# Patient Record
Sex: MALE | Race: WHITE | Employment: OTHER | URBAN - METROPOLITAN AREA
[De-identification: names, ages, dates, MRNs, and addresses within clinical notes are randomized per-mention and may not be internally consistent; named-entity substitution may affect disease eponyms.]

---

## 2018-03-16 ENCOUNTER — THERAPY VISIT (OUTPATIENT)
Dept: PHYSICAL THERAPY | Facility: CLINIC | Age: 63
End: 2018-03-16
Payer: OTHER MISCELLANEOUS

## 2018-03-16 DIAGNOSIS — M54.50 ACUTE LEFT-SIDED LOW BACK PAIN WITHOUT SCIATICA: Primary | ICD-10-CM

## 2018-03-16 PROCEDURE — 97161 PT EVAL LOW COMPLEX 20 MIN: CPT | Mod: GP | Performed by: PHYSICAL THERAPIST

## 2018-03-16 PROCEDURE — 97140 MANUAL THERAPY 1/> REGIONS: CPT | Mod: GP | Performed by: PHYSICAL THERAPIST

## 2018-03-16 PROCEDURE — 97110 THERAPEUTIC EXERCISES: CPT | Mod: GP | Performed by: PHYSICAL THERAPIST

## 2018-03-16 NOTE — PROGRESS NOTES
[facility] Initial Evaluation  Subjective:  Patient is a 63 year old male presenting with rehab back hpi. The history is provided by the patient. No  was used.   Kobi Blanco is a 63 year old male with a lumbar condition.  Condition occurred with:  A fall/slip.  Condition occurred: at work.  This is a recurrent condition  Was doing some inspection of furniture at work.  As he was pulling a box out to inspect it from the bottom of the box, the dresser started to fall and he tried to slow the fall on 2/28/18.  The pain slowly increased over the next couple of days.  Pain continued to get worse when he had to inspect some metal pieces of furniture that weighed ~30#.  Then went MD and was put on muscle relaxers with no relief.  Was off of work for 2 weeks.  Returned to work this last week.  When he went back to the MD he was started on a prednisone pack.   Pain increases with prolonged standing (20-30 min), sitting >1 hr, lifting >40#.  Feels better with massage, light stretching..    Patient reports pain:  Lower lumbar spine.  Radiates to:  Thigh left and gluteals left.  Pain is described as aching and is constant (intermittent spikes) and reported as 7/10.  Associated symptoms:  Loss of motion/stiffness. Pain is the same all the time.  Symptoms are exacerbated by sitting, lifting, walking, standing and twisting and relieved by rest and NSAID's (massage).  Since onset symptoms are gradually improving.  Special tests:  MRI (mild scoliosis).  Previous treatment includes physical therapy.  There was significant improvement following previous treatment.  General health as reported by patient is good.  Pertinent medical history includes:  None.  Medical allergies: no.  Other surgeries include:  No.  Current medications:  None as reported by the patient.  Current occupation is  for furniture    Pt goal: improve core strength to improve his lifting at  work.  Patient is working in normal job with restrictions.  Primary job tasks include:  Repetitive tasks, lifting, prolonged standing and prolonged sitting.    Barriers include:  None as reported by the patient.    Red flags:  None as reported by the patient.                        Objective:  System    Physical Exam                                         Musculoskeletal:        Back:        KYLE Blanco , : 1955, MRN: [id number]    Physical Therapy Objective Findings  Subjective information, goals, clinical impression, daily documentation and other information found in EPISODES tab.  Objective:     Lumbar Pain    Posture: mild scoliosis apex of curve R T12/L1  Gait:  normal  Lumbar Range of Motion:  Flexion                                              66%                                                                                                                             Extension 50%   Right Side Bending 50%   Left Side Bending 66%   Repeated extension- standing    Repeated flexion- standing      Pelvic screen:                                                                         Positive                                            Negative                                             Standing Forward Bend  x   Gillet(March)  x   Supine to sit     Sacroiliac provocation test     Pubic symphysis provocation            -resisted hip add at 45     Other:       Hip Screen:                                                                  Positive                                             Negative                                             Hip ROM     Lashanda GALAN     Other:     Manual Muscle Testing (graded 0-5, measured at 0 degrees unless otherwise noted):                                                                              Right                                  Left                                                     Transversus Abdominus     -Rd Leg Lowering  (deg) 60%    Hip Flex L2 4 4   Hip Abd     Hip Add     Hip Ext 3 3   Knee Flex 5 5   Knee Ext L3 5 5   Ankle Dorsiflexion L4 5 5   Great Toe Extension L5 5 5   Ankle Plantar Flexion S1 5 5   Other:     (+ mild pain, ++ moderate pain, +++ severe pain)    Special Tests:                                                                     Positive                                             Negative                                             Sign of Buttock  x   SLR  X (sciatic n tension at 75 deg B)   Fabrizio Test     Ely Test     Prone instability Test     Crossover SLR     Repeated extension prone     Other:       Flexibility:                                                              Right                                                 Left                                                      Hamstring SLR 75 SLR 75   Hip flexor normal Normal   Quadricep Prone knee flex 125 Prone knee flex 120   Ramesh's     piriformis normal normal   Other:            Segmental Mobility: hypomobile L5, T8-L3, mild pain PA L4    Palpation: hypertonicity L QL     -If symptoms past hip, must do neuro testing  Dermatome/Sensory  Testing:  Normal to light touch BLE  Reflex Testing:                                                                 Right                                                  Left                                                     Patellar Tendon normal normal   Achilles Tendon normal normal   Babinski         Assessment/Plan:    Patient is a 63 year old male with lumbar complaints.    Patient has the following significant findings with corresponding treatment plan.                Diagnosis 1:  Low back pain consistent with L4-L5 disc irritation with hypertonicity L QL    Pain -  hot/cold therapy, manual therapy, self management, education and home program  Decreased ROM/flexibility - manual therapy, therapeutic exercise and home program  Decreased joint mobility - manual therapy, therapeutic exercise and home  program  Decreased strength - therapeutic exercise, therapeutic activities and home program  Decreased function - therapeutic activities and home program  Impaired posture - neuro re-education and home program    Therapy Evaluation Codes:   1) History comprised of:   Personal factors that impact the plan of care:      Past/current experiences and Time since onset of symptoms.    Comorbidity factors that impact the plan of care are:      None.     Medications impacting care: None.  2) Examination of Body Systems comprised of:   Body structures and functions that impact the plan of care:      Lumbar spine.   Activity limitations that impact the plan of care are:      Bending, Lifting, Sitting, Standing and Working.  3) Clinical presentation characteristics are:   Stable/Uncomplicated.  4) Decision-Making    Low complexity using standardized patient assessment instrument and/or measureable assessment of functional outcome.  Cumulative Therapy Evaluation is: Low complexity.    Previous and current functional limitations:  (See Goal Flow Sheet for this information)    Short term and Long term goals: (See Goal Flow Sheet for this information)     Communication ability:  Patient appears to be able to clearly communicate and understand verbal and written communication and follow directions correctly.  Treatment Explanation - The following has been discussed with the patient:   RX ordered/plan of care  Anticipated outcomes  Possible risks and side effects  This patient would benefit from PT intervention to resume normal activities.   Rehab potential is good.    Frequency:  1 X a month, once daily  Duration:  for 6 weeks  Discharge Plan:  Achieve all LTG.  Independent in home treatment program.  Reach maximal therapeutic benefit.    Please refer to the daily flowsheet for treatment today, total treatment time and time spent performing 1:1 timed codes.     Gerald Ramos,PT, DPT, OCS

## 2018-03-16 NOTE — MR AVS SNAPSHOT
After Visit Summary   3/16/2018    Kobi Blanco    MRN: [id number]           Patient Information     Date Of Birth          1955        Visit Information        Provider Department      3/16/2018 3:30 PM Gerald Ramos, PT [facility] Physical Therapy        Today's Diagnoses     Acute left-sided low back pain without sciatica    -  1       Follow-ups after your visit        Your next 10 appointments already scheduled     Mar 27, 2018  3:10 PM CDT   [facility] Sp[facility] with Gerald Ramos PT   [facility] Physical Therapy ([facility]  )    [address]  [place] [address]   [phone]            Apr 03, 2018  3:10 PM CDT   PAM Spine with Gerald Ramos PT   [facility] Physical Therapy ([facility]  )    [address]  [place] [address]   [phone]            Apr 10, 2018  3:10 PM CDT   PAM Spine with Gerald Ramos, PT   [facility] Physical Therapy ([facility]  )    [address]  [facility] [address]   [phone]              Who to contact     If you have questions or need follow up information about today's clinic visit or your schedule please contact [facility] PHYSICAL THERAPY directly at [phone].  Normal or non-critical lab and imaging results will be communicated to you by MyChart, letter or phone within 4 business days after the clinic has received the results. If you do not hear from us within 7 days, please contact the clinic through [employer] or phone. If you have a critical or abnormal lab result, we will notify you by phone as soon as possible.  Submit refill requests through [employer] or call your pharmacy and they will forward the refill request to us. Please allow 3 business days for your refill to be completed.          Additional Information About  "Your Visit        MyChart Information     [employer] lets you send messages to your doctor, view your test results, renew your prescriptions, schedule appointments and more. To sign up, go to [email or web address] . Click on \"Log in\" on the left side of the screen, which will take you to the Welcome page. Then click on \"Sign up Now\" on the right side of the page.     You will be asked to enter the access code listed below, as well as some personal information. Please follow the directions to create your username and password.     Your access code is: [email or web address]  Expires: 2018  4:37 PM     Your access code will  in 90 days. If you need help or a new code, please call your [place] clinic or [phone].        Care EveryWhere ID     This is your Care EveryWhere ID. This could be used by other organizations to access your [place] medical records  [id number]         Blood Pressure from Last 3 Encounters:   10/28/10 136/78    Weight from Last 3 Encounters:   10/28/10 87.8 kg (193 lb 8 oz)              We Performed the Following     HC PT EVAL, LOW COMPLEXITY     PAM INITIAL EVAL REPORT     MANUAL THER TECH,1+REGIONS,EA 15 MIN     THERAPEUTIC EXERCISES        Primary Care Provider Office Phone # Fax #    [facility] [place] [phone]       [facility] [address] [place] [address]        Equal Access to Services     KRISTEL ROWE AH: Hadii aad ku hadasho Soomaali, waaxda luqadaha, qaybta kaalmada adeegyada, oswald hernandezin carmellan jake damian ah. So [facility] [phone].    ATENCIÓN: Si habla español, tiene a angulo disposición servicios gratuitos de asistencia lingüística. Llame al [phone].    We comply with applicable federal civil rights laws and [place] laws. We do not discriminate on the basis of race, color, national origin, age, disability, sex, sexual orientation, or gender identity.            Thank you!     Thank you for choosing [place] [facility] " [facility] PHYSICAL THERAPY  for your care. Our goal is always to provide you with excellent care. Hearing back from our patients is one way we can continue to improve our services. Please take a few minutes to complete the written survey that you may receive in the mail after your visit with us. Thank you!             Your Updated Medication List - Protect others around you: Learn how to safely use, store and throw away your medicines at www.disposemymeds.org.      Notice  As of 3/16/2018  4:37 PM    You have not been prescribed any medications.

## 2018-03-16 NOTE — LETTER
[facility] PHYSICAL [facility]  [address]  [facility] [id number] [phone]    2018    Re: Kobi Blanco   :   1955  MRN:  [id number]   REFERRING PHYSICIAN:   Gerald Blakely    [facility]    Date of Initial Evaluation:  3/16/18  Visits:  Rxs Used: 1  Reason for Referral:  Acute left-sided low back pain without sciatica    EVALUATION SUMMARY    [facility] Initial Evaluation  Subjective:  Patient is a 63 year old male presenting with rehab back hpi. The history is provided by the patient. No  was used.   Kobi Blanco is a 63 year old male with a lumbar condition.  Condition occurred with:  A fall/slip.  Condition occurred: at work.  This is a recurrent condition  Was doing some inspection of furniture at work.  As he was pulling a box out to inspect it from the bottom of the box, the dresser started to fall and he tried to slow the fall on 18.  The pain slowly increased over the next couple of days.  Pain continued to get worse when he had to inspect some metal pieces of furniture that weighed ~30#.  Then went MD and was put on muscle relaxers with no relief.  Was off of work for 2 weeks.  Returned to work this last week.  When he went back to the MD he was started on a prednisone pack.   Pain increases with prolonged standing (20-30 min), sitting >1 hr, lifting >40#.  Feels better with massage, light stretching..    Patient reports pain:  Lower lumbar spine.  Radiates to:  Thigh left and gluteals left.  Pain is described as aching and is constant (intermittent spikes) and reported as 7/10.  Associated symptoms:  Loss of motion/stiffness. Pain is the same all the time.  Symptoms are exacerbated by sitting, lifting, walking, standing and twisting and relieved by rest and NSAID's (massage).  Since onset symptoms are gradually improving.  Special  tests:  MRI (mild scoliosis).  Previous treatment includes physical therapy.  There was significant improvement following previous treatment.  General health as reported by patient is good.  Pertinent medical history includes:  None.  Medical allergies: no.  Other surgeries include:  No.  Current medications:  None as reported by the patient.  Current occupation is  for furniture    Pt goal: improve core strength to improve his lifting at work.  Patient is working in normal job with restrictions.  Primary job tasks include:  Repetitive tasks, lifting, prolonged standing and prolonged sitting.    Barriers include:  None as reported by the patient.    Red flags:  None as reported by the patient.                      Objective:                                   Musculoskeletal:        Back:      Kobi Blanco , : 1955, MRN: [id number]    Physical Therapy Objective Findings  Subjective information, goals, clinical impression, daily documentation and other information found in EPISODES tab.  Objective:     Lumbar Pain    Posture: mild scoliosis apex of curve R T12/L1  Gait:  normal  Lumbar Range of Motion:  Flexion                                              66%                                                                                                                             Extension 50%   Right Side Bending 50%   Left Side Bending 66%   Repeated extension- standing    Repeated flexion- standing      Pelvic screen:                                                                         Positive                                            Negative                                             Standing Forward Bend  x   Gillet(March)  x   Supine to sit     Sacroiliac provocation test     Pubic symphysis provocation            -resisted hip add at 45     Other:       Hip Screen:                                                                  Positive                                              Negative                                             Hip ROM     Lashanda GALAN     Other:     Manual Muscle Testing (graded 0-5, measured at 0 degrees unless otherwise noted):                                                                              Right                                  Left                                                     Transversus Abdominus     -Rd Leg Lowering (deg) 60%    Hip Flex L2 4 4   Hip Abd     Hip Add     Hip Ext 3 3   Knee Flex 5 5   Knee Ext L3 5 5   Ankle Dorsiflexion L4 5 5   Great Toe Extension L5 5 5   Ankle Plantar Flexion S1 5 5   Other:     (+ mild pain, ++ moderate pain, +++ severe pain)    Special Tests:                                                                     Positive                                             Negative                                             Sign of Buttock  x   SLR  X (sciatic n tension at 75 deg B)   Fabrizio Test     Ely Test     Prone instability Test     Crossover SLR     Repeated extension prone     Other:       Flexibility:                                                              Right                                                 Left                                                      Hamstring SLR 75 SLR 75   Hip flexor normal Normal   Quadricep Prone knee flex 125 Prone knee flex 120   Ramesh's     piriformis normal normal   Other:            Segmental Mobility: hypomobile L5, T8-L3, mild pain PA L4    Palpation: hypertonicity L QL     -If symptoms past hip, must do neuro testing  Dermatome/Sensory  Testing:  Normal to light touch BLE  Reflex Testing:                                                                 Right                                                  Left                                                     Patellar Tendon normal normal   Achilles Tendon normal normal   Babinski         Assessment/Plan:    Patient is a 63 year old male with lumbar complaints.    Patient  has the following significant findings with corresponding treatment plan.                Diagnosis 1:  Low back pain consistent with L4-L5 disc irritation with hypertonicity L QL  Pain -  hot/cold therapy, manual therapy, self management, education and home program  Decreased ROM/flexibility - manual therapy, therapeutic exercise and home program  Decreased joint mobility - manual therapy, therapeutic exercise and home program  Decreased strength - therapeutic exercise, therapeutic activities and home program  Decreased function - therapeutic activities and home program  Impaired posture - neuro re-education and home program    Therapy Evaluation Codes:   1) History comprised of:   Personal factors that impact the plan of care:      Past/current experiences and Time since onset of symptoms.    Comorbidity factors that impact the plan of care are:      None.     Medications impacting care: None.  2) Examination of Body Systems comprised of:   Body structures and functions that impact the plan of care:      Lumbar spine.   Activity limitations that impact the plan of care are:      Bending, Lifting, Sitting, Standing and Working.  3) Clinical presentation characteristics are:   Stable/Uncomplicated.  4) Decision-Making    Low complexity using standardized patient assessment instrument and/or measureable assessment of functional outcome.  Cumulative Therapy Evaluation is: Low complexity.    Previous and current functional limitations:  (See Goal Flow Sheet for this information)    Short term and Long term goals: (See Goal Flow Sheet for this information)     Communication ability:  Patient appears to be able to clearly communicate and understand verbal and written communication and follow directions correctly.  Treatment Explanation - The following has been discussed with the patient:   RX ordered/plan of care  Anticipated outcomes  Possible risks and side effects  This patient would benefit from PT intervention to  resume normal activities.   Rehab potential is good.      Frequency:  1 X a month, once daily  Duration:  for 6 weeks  Discharge Plan:  Achieve all LTG.  Independent in home treatment program.  Reach maximal therapeutic benefit.         Thank you for your referral.    INQUIRIES  Therapist: Gerald Ramos,PT, DPT, [facility]    [facility] PHYSICAL [facility]ERAP[facility]  [address] [place] [address] [address]  [facility] [address]  Phone: [phone]  Fax: [phone]

## 2018-03-27 ENCOUNTER — THERAPY VISIT (OUTPATIENT)
Dept: PHYSICAL THERAPY | Facility: CLINIC | Age: 63
End: 2018-03-27
Payer: OTHER MISCELLANEOUS

## 2018-03-27 DIAGNOSIS — M54.50 ACUTE LEFT-SIDED LOW BACK PAIN WITHOUT SCIATICA: ICD-10-CM

## 2018-03-27 PROCEDURE — 97110 THERAPEUTIC EXERCISES: CPT | Mod: GP | Performed by: PHYSICAL THERAPIST

## 2018-03-27 PROCEDURE — 97140 MANUAL THERAPY 1/> REGIONS: CPT | Mod: GP | Performed by: PHYSICAL THERAPIST

## 2018-03-27 PROCEDURE — 97112 NEUROMUSCULAR REEDUCATION: CPT | Mod: GP | Performed by: PHYSICAL THERAPIST

## 2018-04-03 ENCOUNTER — THERAPY VISIT (OUTPATIENT)
Dept: PHYSICAL THERAPY | Facility: CLINIC | Age: 63
End: 2018-04-03
Payer: OTHER MISCELLANEOUS

## 2018-04-03 DIAGNOSIS — M54.50 ACUTE LEFT-SIDED LOW BACK PAIN WITHOUT SCIATICA: ICD-10-CM

## 2018-04-03 PROCEDURE — 97110 THERAPEUTIC EXERCISES: CPT | Mod: GP | Performed by: PHYSICAL THERAPIST

## 2018-04-03 PROCEDURE — 97112 NEUROMUSCULAR REEDUCATION: CPT | Mod: GP | Performed by: PHYSICAL THERAPIST

## 2018-04-24 ENCOUNTER — THERAPY VISIT (OUTPATIENT)
Dept: PHYSICAL THERAPY | Facility: CLINIC | Age: 63
End: 2018-04-24
Payer: OTHER MISCELLANEOUS

## 2018-04-24 DIAGNOSIS — M54.50 ACUTE LEFT-SIDED LOW BACK PAIN WITHOUT SCIATICA: ICD-10-CM

## 2018-04-24 PROCEDURE — 97110 THERAPEUTIC EXERCISES: CPT | Mod: GP | Performed by: PHYSICAL THERAPIST

## 2018-04-24 PROCEDURE — 97530 THERAPEUTIC ACTIVITIES: CPT | Mod: GP | Performed by: PHYSICAL THERAPIST

## 2018-05-01 ENCOUNTER — THERAPY VISIT (OUTPATIENT)
Dept: PHYSICAL THERAPY | Facility: CLINIC | Age: 63
End: 2018-05-01
Payer: OTHER MISCELLANEOUS

## 2018-05-01 DIAGNOSIS — M54.50 ACUTE LEFT-SIDED LOW BACK PAIN WITHOUT SCIATICA: ICD-10-CM

## 2018-05-01 PROCEDURE — 97530 THERAPEUTIC ACTIVITIES: CPT | Mod: GP | Performed by: PHYSICAL THERAPIST

## 2018-05-01 PROCEDURE — 97110 THERAPEUTIC EXERCISES: CPT | Mod: GP | Performed by: PHYSICAL THERAPIST

## 2018-05-01 NOTE — MR AVS SNAPSHOT
"              After Visit Summary   2018    Kobi Blanco    MRN: [id number]           Patient Information     Date Of Birth          1955        Visit Information        Provider Department      2018 3:50 PM Gerald Ramos PT [facility]        Today's Diagnoses     Acute left-sided low back pain without sciatica           Follow-ups after your visit        Who to contact     If you have questions or need follow up information about today's clinic visit or your schedule please contact [facility] directly at [phone].  Normal or non-critical lab and imaging results will be communicated to you by MakeSpacehart, letter or phone within 4 business days after the clinic has received the results. If you do not hear from us within 7 days, please contact the clinic through MakeSpacehart or phone. If you have a critical or abnormal lab result, we will notify you by phone as soon as possible.  Submit refill requests through Hintsoft or call your pharmacy and they will forward the refill request to us. Please allow 3 business days for your refill to be completed.          Additional Information About Your Visit        MyChart Information     Hintsoft lets you send messages to your doctor, view your test results, renew your prescriptions, schedule appointments and more. To sign up, go to [email or web address] . Click on \"Log in\" on the left side of the screen, which will take you to the Welcome page. Then click on \"Sign up Now\" on the right side of the page.     You will be asked to enter the access code listed below, as well as some personal information. Please follow the directions to create your username and password.     Your access code is: [email or web address]  Expires: 2018  4:37 PM     Your access code will  in 90 days. If you need help or a new code, please call your [place] clinic or [phone].      "   Care EveryWhere ID     This is your Care EveryWhere ID. This could be used by other organizations to access your [place] medical records  [id number]         Blood Pressure from Last 3 Encounters:   10/28/10 136/78    Weight from Last 3 Encounters:   10/28/10 87.8 kg (193 lb 8 oz)              We Performed the Following     PAM PROGRESS NOTES REPORT     THERAPEUTIC ACTIVITIES     THERAPEUTIC EXERCISES        Primary Care Provider Office Phone # Fax #    [facility] [phone]       [facility] [address]  [facility] [address]        Equal Access to Services     [facility]: Hadii aad ku hadasho Soomaali, waaxda luqadaha, qaybta kaalmada adeegyada, oswald damian . So [facility] [phone].    ATENCIÓN: Si habla español, tiene a angulo disposición servicios gratuitos de asistencia lingüística. [facility] [phone].    We comply with applicable federal civil rights laws and [place] laws. We do not discriminate on the basis of race, color, national origin, age, disability, sex, sexual orientation, or gender identity.            Thank you!     Thank you for choosing [facility]  for your care. Our goal is always to provide you with excellent care. Hearing back from our patients is one way we can continue to improve our services. Please take a few minutes to complete the written survey that you may receive in the mail after your visit with us. Thank you!             Your Updated Medication List - Protect others around you: Learn how to safely use, store and throw away your medicines at www.disposemymeds.org.      Notice  As of 5/1/2018  4:45 PM    You have not been prescribed any medications.

## 2018-05-01 NOTE — LETTER
[facility]  [address]  [facility] [id number] [phone]    May 2, 2018    Re: Kobi Blanco   :   1955  MRN:  [id number]   REFERRING PHYSICIAN:   Greald Blakely    [facility]  Date of Initial Evaluation: 3/16/18  Visits:  Rxs Used: 5  Reason for Referral:  Acute left-sided low back pain without sciatica  Oswestry Score: 0 %                 DISCHARGE REPORT  Progress reporting period is from 3/16/18 to 18.       SUBJECTIVE  Subjective changes noted by patient:  doing pretty well doing day, will get some muscle spasms if he does a lot of standing, can control some of the muscle spasms with stretching, no problems sleeping, lifting going well around house, no problems lifting salt bags, no problems moving furniture at work as long as he is careful with how he lifts    Current Pain level: 0/10 (worst 1-2/10).     Previous pain level was  NA Initial Pain level: 8/10.   Changes in function:  Yes (See Goal flowsheet attached for changes in current functional level)  Adverse reaction to treatment or activity: None    OBJECTIVE  Changes noted in objective findings:    Objective: lumbar ROM: flex 90%, ext 50%, R SB 75%, L SB 75%, SLR R 90, L 90, double leg lowering 75%, MMT: hip ext: 4/5 B, middle trap 5/5 B, lower trap 4/5 B     ASSESSMENT/PLAN  Updated problem list and treatment plan: Diagnosis 1:  Low back pain consistent with L4-L5 disc irritation with hypertonicity L QL  Pain -  home program  Decreased strength - home program  STG/LTGs have been met or progress has been made towards goals:  Yes (See Goal flow sheet completed today.)  Assessment of Progress: The patient has met all of their long term goals.  Self Management Plans:  Patient has been instructed in a home treatment program.  I have re-evaluated this patient and find that the nature, scope, duration and intensity of the  therapy is appropriate for the medical condition of the patient.  Kobi continues to require the following intervention to meet STG and LTG's:  PT intervention is no longer required to meet STG/LTG.    Recommendations:  This patient is ready to be discharged from therapy and continue their home treatment program.          Thank you for your referral.    INQUIRIES  Therapist: Gerald Ramos,PT, DPT, [facility]    [facility]  [address] [place] [address] [address]  [facility] [address]  Phone: [phone]  Fax: [phone]

## 2018-05-01 NOTE — PROGRESS NOTES
Subjective:  HPI  Oswestry Score: 0 %                 Objective:  System    Physical Exam    General     ROS    Assessment/Plan:    DISCHARGE REPORT    Progress reporting period is from 3/16/18 to 5/1/18.       SUBJECTIVE  Subjective changes noted by patient:  doing pretty well doing day, will get some muscle spasms if he does a lot of standing, can control some of the muscle spasms with stretching, no problems sleeping, lifting going well around house, no problems lifting salt bags, no problems moving furniture at work as long as he is careful with how he lifts    Current Pain level: 0/10 (worst 1-2/10).     Previous pain level was  NA Initial Pain level: 8/10.   Changes in function:  Yes (See Goal flowsheet attached for changes in current functional level)  Adverse reaction to treatment or activity: None    OBJECTIVE  Changes noted in objective findings:    Objective: lumbar ROM: flex 90%, ext 50%, R SB 75%, L SB 75%, SLR R 90, L 90, double leg lowering 75%, MMT: hip ext: 4/5 B, middle trap 5/5 B, lower trap 4/5 B     ASSESSMENT/PLAN  Updated problem list and treatment plan: Diagnosis 1:  Low back pain consistent with L4-L5 disc irritation with hypertonicity L QL    Pain -  home program  Decreased strength - home program  STG/LTGs have been met or progress has been made towards goals:  Yes (See Goal flow sheet completed today.)  Assessment of Progress: The patient has met all of their long term goals.  Self Management Plans:  Patient has been instructed in a home treatment program.  I have re-evaluated this patient and find that the nature, scope, duration and intensity of the therapy is appropriate for the medical condition of the patient.  Kobi continues to require the following intervention to meet STG and LTG's:  PT intervention is no longer required to meet STG/LTG.    Recommendations:  This patient is ready to be discharged from therapy and continue their home treatment program.    Please refer to the  daily flowsheet for treatment today, total treatment time and time spent performing 1:1 timed codes.      Gerald Ramos,PT, DPT, OCS

## 2019-07-12 ENCOUNTER — TELEPHONE (OUTPATIENT)
Dept: NEUROLOGY | Facility: CLINIC | Age: 64
End: 2019-07-12

## 2019-07-12 NOTE — TELEPHONE ENCOUNTER
[facility] Call Center    Phone Message    May a detailed message be left on voicemail: yes    Reason for Call: Other: pt's wife calling to get more information on treatments plans or something to help the pt feel like he is helping better his situation. Please call pt's wife back to discuss.     Action Taken: Message routed to:  [facility] neurology

## 2019-07-15 NOTE — TELEPHONE ENCOUNTER
Patient will be seeing Dr. Massey for the first time on 7/31/19. Called patient's wife Juan, back and left voice mail that she should ask for the authorization to discuss protected health information form when they come on the 31st.

## 2019-07-22 NOTE — TELEPHONE ENCOUNTER
RECORDS RECEIVED FROM: External - Dr. Jason Owusu, records at [facility]    DATE RECEIVED: 7/31/19   NOTES (FOR ALL VISITS) STATUS DETAILS   OFFICE NOTE from referring provider Care Everywhere 7/10/19  7/9/19  5/30/19   OFFICE NOTE from other specialist N/A    DISCHARGE SUMMARY from hospital N/A    DISCHARGE REPORT from the ER N/A    OPERATIVE REPORT N/A    MEDICATION LIST Care Everywhere    IMAGING  (FOR ALL VISITS)     EMG Care Everywhere [facility]:  5/30/19   EEG N/A    ECT N/A    MRI (HEAD, NECK, SPINE) Received  [facility]:  MRI Lumbar Spine 5/3/19  MRI Thoracic Spine 5/3/19  MRI Brain 4/27/19  MRI Cervical Spine 4/27/19   LUMBAR PUNCTURE N/A    SAVANA Scan N/A    CT (HEAD, NECK, SPINE) N/A       Action    Action Taken Imaging request faxed to [facility]

## 2019-07-26 NOTE — TELEPHONE ENCOUNTER
Imaging Received  [facility]   Image Type (x): Disc:   PACS: x   Exam Date/Name MRI Lumbar Spine 5/3/19  MRI Thoracic Spine 5/3/19  MRI Brain 4/27/19  MRI Cervical Spine 4/27/19 Comments: Images resolved in PACS

## 2019-07-30 ENCOUNTER — DOCUMENTATION ONLY (OUTPATIENT)
Dept: CARE COORDINATION | Facility: CLINIC | Age: 64
End: 2019-07-30

## 2019-07-31 ENCOUNTER — PRE VISIT (OUTPATIENT)
Dept: NEUROLOGY | Facility: CLINIC | Age: 64
End: 2019-07-31

## 2019-07-31 ENCOUNTER — OFFICE VISIT (OUTPATIENT)
Dept: NEUROLOGY | Facility: CLINIC | Age: 64
End: 2019-07-31
Payer: COMMERCIAL

## 2019-07-31 VITALS
HEART RATE: 105 BPM | DIASTOLIC BLOOD PRESSURE: 96 MMHG | BODY MASS INDEX: 25.99 KG/M2 | WEIGHT: 197 LBS | SYSTOLIC BLOOD PRESSURE: 157 MMHG

## 2019-07-31 DIAGNOSIS — G90.3 MULTIPLE SYSTEM ATROPHY (H): Primary | ICD-10-CM

## 2019-07-31 DIAGNOSIS — G23.8 MULTIPLE SYSTEM ATROPHY (H): Primary | ICD-10-CM

## 2019-07-31 RX ORDER — UBIDECARENONE 100 MG
1000 CAPSULE ORAL
COMMUNITY

## 2019-07-31 RX ORDER — AMLODIPINE BESYLATE 5 MG/1
5 TABLET ORAL DAILY
COMMUNITY

## 2019-07-31 RX ORDER — MULTIVITAMIN
1 CAPSULE ORAL
COMMUNITY

## 2019-07-31 RX ORDER — CHLORAL HYDRATE 500 MG
4 CAPSULE ORAL
COMMUNITY

## 2019-07-31 RX ORDER — PSYLLIUM HUSK/CALCIUM CARB 1 G-60 MG
CAPSULE ORAL
COMMUNITY

## 2019-07-31 ASSESSMENT — UNIFIED PARKINSONS DISEASE RATING SCALE (UPDRS)
TOTAL_SCORE: 13
POSTURE: 0 NORMAL, NO PROBLEMS
POSTURAL_STABILITY: MODERATE: STANDS SAFELY, BUT WITH ABSENCE OF POSTURAL RESPONSE,  FALLS IF NOT CAUGHT BY EXAMINER.
FACIAL_EXPRESSION: MILD: IN ADDITION TO DECREASED EYE-BLINK FREQUENCY, MASKED FACIES PRESENT IN THE LOWER FACE AS WELL, NAMELY FEWER MOVEMENTS AROUND THE MOUTH, SUCH AS LESS SPONTANEOUS SMILING, BUT LIPS NOT PARTED.
TOETAPPING_RIGHT: NORMAL
GAIT: SLIGHT: INDEPENDENT WALKING WITH MINOR GAIT IMPAIRMENT.
HANDMOVEMENTS_RIGHT: SLIGHT: ANY OF THE FOLLOWING: A) THE REGULAR RHYTHM IS BROKEN WITH ONE WITH ONE OR TWO INTERRUPTIONS OR HESITATIONS OF THE MOVEMENT B) SLIGHT SLOWING C) THE AMPLITUDE DECREMENTS NEAR THE END OF THE 10 MOVEMENTS.
SPONTANEITY_OF_MOVEMENT: 0: NORMAL.  NO PROBLEMS.
RIGIDITY_LUE: NORMAL
AMPLITUDE_LIP_JAW: NORMAL: NO TREMOR.
AXIAL_SCORE: 8
SPEECH: MILD: LOSS OF MODULATION, DICTION OR VOLUME, WITH A FEW WORDS UNCLEAR, BUT THE OVERALL SENTENCES EASY TO FOLLOW.
PRONATION_SUPINATION_LEFT: NORMAL
RIGIDITY_NECK: NORMAL
RIGIDITY_LLE: NORMAL
AMPLITUDE_RLE: NORMAL: NO TREMOR.
HANDMOVEMENTS_LEFT: SLIGHT: ANY OF THE FOLLOWING: A) THE REGULAR RHYTHM IS BROKEN WITH ONE WITH ONE OR TWO INTERRUPTIONS OR HESITATIONS OF THE MOVEMENT B) SLIGHT SLOWING C) THE AMPLITUDE DECREMENTS NEAR THE END OF THE 10 MOVEMENTS.
CONSTANCY_TREMOR_ATREST: NORMAL: NO TREMOR.
FINGER_TAPPING_RIGHT: SLIGHT: ANY OF THE FOLLOWING: A) THE REGULAR RHYTHM IS BROKEN WITH ONE WITH ONE OR TWO INTERRUPTIONS OR HESITATIONS OF THE MOVEMENT B) SLIGHT SLOWING C) THE AMPLITUDE DECREMENTS NEAR THE END OF THE 10 MOVEMENTS.
AMPLITUDE_RUE: NORMAL: NO TREMOR.
LEG_AGILITY_LEFT: NORMAL
TOTAL_SCORE_LEFT: 2
TOTAL_SCORE: 3
FREEZING_GAIT: NORMAL
ARISING_CHAIR: NORMAL: ABLE TO ARISE QUICKLY WITHOUT HESITATION.
PRONATION_SUPINATION_RIGHT: NORMAL
AMPLITUDE_LUE: NORMAL: NO TREMOR.
RIGIDITY_RUE: SLIGHT: RIGIDITY ONLY DETECTED WITH ACTIVATION MANEUVER.
TOETAPPING_LEFT: NORMAL
PARKINSONS_MEDS: OFF
AMPLITUDE_LLE: NORMAL: NO TREMOR.
FINGER_TAPPING_LEFT: SLIGHT: ANY OF THE FOLLOWING: A) THE REGULAR RHYTHM IS BROKEN WITH ONE WITH ONE OR TWO INTERRUPTIONS OR HESITATIONS OF THE MOVEMENT B) SLIGHT SLOWING C) THE AMPLITUDE DECREMENTS NEAR THE END OF THE 10 MOVEMENTS.
LEG_AGILITY_RIGHT: NORMAL
RIGIDITY_RLE: NORMAL

## 2019-07-31 ASSESSMENT — PAIN SCALES - GENERAL: PAINLEVEL: NO PAIN (0)

## 2019-07-31 NOTE — LETTER
2019       RE: Kobi Blanco  [address] [place] [address]     Dear Colleague,    Thank you for referring your patient, Kobi Blanco, to the [facility] NEUROLOGY at [facility]. Please see a copy of my visit note below.    Department of Neurology  Movement Disorders Division     Patient: Kobi Blanco   MRN: [id number]   : 1955   Date of Visit: 2019     Reason for visit: Longitudinal care for multiple system atrophy    Referring Physician: Dr. GUSTABO Owusu    History of Present Illness    Mr. 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  This diagnosis was made recently at the [facility] in [place] and we are asked to provide longitudinal care. We are happy to provide this.  While at the [facility] the patient had seen Dr. Jimmy Owusu, Dr. June Singleton and Dr. Alonzo Melton.    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  He worked for the [facility] [employer] and for [employer].    15 years ago he began to have dream enactment.   He has been  to his current wife for 7 years.   She describes active dreaming for their entire marriage.  She has put up a pillow barrier so that she is not struck during his dreams.  The patient has jumped out of bed on two occasions without injury.  He is taking melatonin now at bedtime.  On this his wife says that his dream enactment is much less and consists of talking and occasional movement of his arms.    He has had autonomic dysfunction for 7 years.  This first involved erectile dysfunction.  It became much more severe about a year and a half ago.  He began to have urinary hesitance.  In 2019 he had catheterization with drainage of 1700 cc of urine by a  "urologist.  He now uses catheterization.  He has never had clear orthostatic dizziness.  He has never had heat exhaustion or heat stroke.    2 to 3 years ago he began to have some cognitive problems.  He had what he describes as \"brain fog\".  He felt himself unable to continue the intense work of flying helicopters.  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    2-1/2 years ago he began to have some imbalance.  It became worse and then he could not walk a straight line.  He went to a neurologist who found that he had poor tandem gait and finger-nose-finger problems.  He found it hard to get out of bed.  He has not had falls but he is slowing down overall.  He saw a neurologist who felt he had parkinsonism.    In April 2019 he saw Dr. Berry in neurological consultation at [facility].  MRI of the head and neck were performed which were normal.  An EMG was planned.    The patient could not wait for the work-up and went to the [facility] in [place] where his work-up was rapid and comprehensive.  He saw Dr. Jimmy Owusu.  There was some concern about stridor and the patient saw Dr. Alonzo Melton.  A polysomnogram was performed and stridor was not observed.  The patient did undergo autonomic reflex studies.  This showed an abnormal study. By report: \"There is evidence of focal or patchy postganglionic sympathetic pseudomotor, moderate cardio vagal and moderate cardiovascular adrenergic impairment.  The presence of orthostatic hypotension with modest adrenergic impairment may suggest contribution of other factors including medication effects.  The findings were milder than typically seen in multiple system atrophy but would be consistent with that diagnosis.\"  A thermoregulatory sweat test showed anhidrosis consistent with MSA.  EMG showed mild length dependent neuropathy.    The patient saw Dr. June Singleton and I read through her comprehensive and excellent summary.  She reviewed the MRI scan of the brain and felt " "there was cerebellar atrophy.  There was no \"hot cross bun\" sign.  Dr. Singleton felt all the data was consistent with multiple system atrophy.  She recommended follow-up in the MSA clinic at [place] yearly.    Parkinsonism Motor Symptom Review:    Motor fluctuations:     Dyskinesia:  Duration - na.  Disability - na.  Wearing off:  na.  Freezing of gait: no  Dystonia: no  Tremor: no  Rigidity: yes  Bradykinesia: yes     Parkinsonism Non-motor Symptom Review:    Psychiatric disturbances - no.  Cognitive impairment -  yes.  Sleep disturbances - yes.   GI symptoms - no.  Urinary symptoms - yes retention.   Balance - yes.  Pain - no.  Autonomic dysfunction - yes.  Hallucinations - yes.  Speech - soft.  Swallowing - water to wash.  Salivation - drool at night.  Dopamine agonist side effects - na.  Driving: good.  Living situation: private home  Medication compliance na.  ADL's: good.    Write disability letter  Balance heat exertion  100%    Review of Systems:  Other than that mentioned above, the remainder of 12 systems reviewed were negative.    Medications:  Current Outpatient Medications   Medication Sig Dispense Refill     Cholecalciferol (D3-1000) 1000 units CAPS Take 1,000 Units by mouth       multivitamin (DEKAS ESSENTIAL) capsule Take 1 capsule by mouth       Omega-3 1000 MG capsule Take 4 capsules by mouth           Physical Exam:  BP (!) 157/96   Pulse 105   Wt 89.4 kg (197 lb)   BMI 25.99 kg/m        Neurological exam  Mental status, patient is alert and oriented to person place time, provides details of the interval history.  Voice is soft and speech is slightly dysarthric.  Cranial nerves: Extraocular movements are intact, good smooth pursuit, may be decreased rate of eye blinking.  Face is symmetric, sensation intact to light touch.  Motor exam: No abnormal movements, rigidity in upper extremities 1+, no rigidity in the lower extremities.     The patient's  weight is 89.4 kg (197 lb).    UPDRS Values 7/31/2019 "   Time: 5:02 PM   Medication Off   R Brain DBS: None   L Brain DBS: None   Speech 2   Facial Expression 2   Rigidity Neck 0   Rigidity RUE 1   Rigidity LUE 0   Rigidity RLE 0   Rigidity LLE 0   Finger Taps R 1   Finger Taps L 1   Hand Mvt R 1   Hand Mvt L 1   Pron-/Supinate R 0   Pron-/Supinate L 0   Toe Tap R 0   Toe Tap L 0   Leg Agility R 0   Leg Agility L 0   Arise From Chair 0   Gait 1   Gait Freezing 0   Postural Stability 3   Posture 0   Global Spont Mvt 0   Postural Tremor RUE 0   Postural Tremor LUE 0   Kinetic Tremor RUE 0   Kinetic Tremor LUE 0   Rest Tremor RUE 0   Rest Tremor LUE 0   Rest Tremor RLE 0   Rest Tremor LLE 0   Rest Tremor Lip/Jaw 0   Rest Tremor Constancy 0   Total Right 3   Total Left 2   Axial Total 8   Total 13     Sensation seems to be intact to light touch.  Gait able to stand with arms crossed without support,  bilateral reduced arm swing, speed but decreased stride length.    Data Reviewed:   MRI brain reviewed, and noted some cerebellar atrophy, there are no structural lesions, no pontine atrophy, no hot bun cross sign.    Impression:   1.   Multiple system atrophy  2.  Neurogenic bladder secondary to #1    Plan:   -We encouraged to continue exercising safely, serial intensity exercises for 30 minutes due to anhidrosis.  Avoid increased duration of exercises.  -Patient would like to be evaluated by physical therapist at [facility], referral has been provided  -Patient is aware that carbidopa/levodopa therapy is available if his parkinsonian symptoms become worse.  -Patient agreed to follow-up with our clinic every 3 months for now and continue to be seen at [facility] probably once a year.  - The patient asked for a letter of full disability and we will provide this.    Patient seen and discussed with Dr. Dangelo.    Little Wolff MD  Movement Disorders fellow.    RTC: 3 months .    Summary of orders placed this encounter:  No orders of the defined types were placed  in this encounter.    I, Mariano Massey, personally interviewed, examined and evaluated this patient on 7/31/2019.  I discussed the patient with Dr. Little Wolff and agree with the assessment, examination and plan of care documented by Dr. Wolff.  I personally reviewed the vital signs, medications and labs/imaging.    Mariano Massey MD

## 2019-07-31 NOTE — LETTER
Kobi Blanco  [address] [facility] [address]      July 31, 2019    To whom it may concern:    I am the neurologist who saw Mr.Russell YUMIKO Blanco in the department of neurology at the [facility].  His evaluation occurred on July 31, 2019.    Based on my history and examination I have concluded that Mr. Blanco is totally disabled for his current work.  He carries a diagnosis of multiple system atrophy.  With this disease he cannot stand for long periods of time.  He cannot lift heavy objects.  He cannot be in a warm environment as he has poor temperature control.  All of these factors led me to conclude that Mr. Blanco's current occupation is hazardous for him and that he has total disability at this time.    Sincerely yours Mariano hilton MD

## 2019-07-31 NOTE — NURSING NOTE
Chief Complaint   Patient presents with     Consult     NEW PATIENT- JORGE Valdivia, FLEX  Patient Care Supervisor  Endocrinology & Diabetes   Neurology, Neurosurgery, PM&R

## 2019-07-31 NOTE — PROGRESS NOTES
"Department of Neurology  Movement Disorders Division     Patient: Kobi Blanco   MRN: [id number]   : 1955   Date of Visit: 2019     Reason for visit: Longitudinal care for multiple system atrophy    Referring Physician: Dr. GUSTABO Owusu    History of Present Illness    Mr. Blanco is a 64 year old male who presents to [facility] Movement Disorders clinic as a new patient for evaluation of multiple system atrophy.  This diagnosis was made recently at the [facility] in [place] and we are asked to provide longitudinal care. We are happy to provide this.  While at the [facility] the patient had seen Dr. Jimmy Owusu, Dr. June Singleton and Dr. Alonzo Melton.    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  He worked for the [facility] and for [employer].    15 years ago he began to have dream enactment.   He has been  to his current wife for 7 years.   She describes active dreaming for their entire marriage.  She has put up a pillow barrier so that she is not struck during his dreams.  The patient has jumped out of bed on two occasions without injury.  He is taking melatonin now at bedtime.  On this his wife says that his dream enactment is much less and consists of talking and occasional movement of his arms.    He has had autonomic dysfunction for 7 years.  This first involved erectile dysfunction.  It became much more severe about a year and a half ago.  He began to have urinary hesitance.  In 2019 he had catheterization with drainage of 1700 cc of urine by a urologist.  He now uses catheterization.  He has never had clear orthostatic dizziness.  He has never had heat exhaustion or heat stroke.    2 to 3 years ago he began to have some cognitive problems.  He had what he describes as \"brain fog\".  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    2-1/2 years ago he began to have some imbalance.  It became worse and then he could not walk a straight line.  He went to a neurologist who found that he had poor tandem gait and finger-nose-finger problems.  He found it hard to get out of bed.  He has not had falls but he is slowing down overall.  He saw a neurologist who felt he had parkinsonism.    In April 2019 he saw Dr. Berry in neurological consultation at [facility].  MRI of the head and neck were performed which were normal.  An EMG was planned.    The patient could not wait for the work-up and went to the [facility] in [place] where his work-up was rapid and comprehensive.  He saw Dr. Jimmy Owusu.  There was some concern about stridor and the patient saw Dr. Alonzo Melton.  A polysomnogram was performed and stridor was not observed.  The patient did undergo autonomic reflex studies.  This showed an abnormal study. By report: \"There is evidence of focal or patchy postganglionic sympathetic pseudomotor, moderate cardio vagal and moderate cardiovascular adrenergic impairment.  The presence of orthostatic hypotension with modest adrenergic impairment may suggest contribution of other factors including medication effects.  The findings were milder than typically seen in multiple system atrophy but would be consistent with that diagnosis.\"  A thermoregulatory sweat test showed anhidrosis consistent with MSA.  EMG showed mild length dependent neuropathy.    The patient saw Dr. June Singleton and I read through her comprehensive and excellent summary.  She reviewed the MRI scan of the brain and felt there was cerebellar atrophy.  There was no \"hot cross bun\" sign.  Dr. Singleton felt all the data was consistent with multiple system atrophy.  She recommended follow-up in the MSA clinic at [place] yearly.    Parkinsonism Motor Symptom Review:    Motor fluctuations:     Dyskinesia:  Duration " - na.  Disability - na.  Wearing off:  na.  Freezing of gait: no  Dystonia: no  Tremor: no  Rigidity: yes  Bradykinesia: yes     Parkinsonism Non-motor Symptom Review:    Psychiatric disturbances - no.  Cognitive impairment -  yes.  Sleep disturbances - yes.   GI symptoms - no.  Urinary symptoms - yes retention.   Balance - yes.  Pain - no.  Autonomic dysfunction - yes.  Hallucinations - yes.  Speech - soft.  Swallowing - water to wash.  Salivation - drool at night.  Dopamine agonist side effects - na.  Driving: good.  Living situation: private home  Medication compliance na.  ADL's: good.    Write disability letter  Balance heat exertion  100%    Review of Systems:  Other than that mentioned above, the remainder of 12 systems reviewed were negative.    Medications:  Current Outpatient Medications   Medication Sig Dispense Refill     Cholecalciferol (D3-1000) 1000 units CAPS Take 1,000 Units by mouth       multivitamin (DEKAS ESSENTIAL) capsule Take 1 capsule by mouth       Omega-3 1000 MG capsule Take 4 capsules by mouth           Physical Exam:  BP (!) 157/96   Pulse 105   Wt 89.4 kg (197 lb)   BMI 25.99 kg/m       Neurological exam  Mental status, patient is alert and oriented to person place time, provides details of the interval history.  Voice is soft and speech is slightly dysarthric.  Cranial nerves: Extraocular movements are intact, good smooth pursuit, may be decreased rate of eye blinking.  Face is symmetric, sensation intact to light touch.  Motor exam: No abnormal movements, rigidity in upper extremities 1+, no rigidity in the lower extremities.     The patient's  weight is 89.4 kg (197 lb).    UPDRS Values 7/31/2019   Time: 5:02 PM   Medication Off   R Brain DBS: None   L Brain DBS: None   Speech 2   Facial Expression 2   Rigidity Neck 0   Rigidity RUE 1   Rigidity LUE 0   Rigidity RLE 0   Rigidity LLE 0   Finger Taps R 1   Finger Taps L 1   Hand Mvt R 1   Hand Mvt L 1   Pron-/Supinate R 0    Pron-/Supinate L 0   Toe Tap R 0   Toe Tap L 0   Leg Agility R 0   Leg Agility L 0   Arise From Chair 0   Gait 1   Gait Freezing 0   Postural Stability 3   Posture 0   Global Spont Mvt 0   Postural Tremor RUE 0   Postural Tremor LUE 0   Kinetic Tremor RUE 0   Kinetic Tremor LUE 0   Rest Tremor RUE 0   Rest Tremor LUE 0   Rest Tremor RLE 0   Rest Tremor LLE 0   Rest Tremor Lip/Jaw 0   Rest Tremor Constancy 0   Total Right 3   Total Left 2   Axial Total 8   Total 13     Sensation seems to be intact to light touch.  Gait able to stand with arms crossed without support,  bilateral reduced arm swing, speed but decreased stride length.    Data Reviewed:   MRI brain reviewed, and noted some cerebellar atrophy, there are no structural lesions, no pontine atrophy, no hot bun cross sign.    Impression:   1.   Multiple system atrophy  2.  Neurogenic bladder secondary to #1    Plan:   -We encouraged to continue exercising safely, serial intensity exercises for 30 minutes due to anhidrosis.  Avoid increased duration of exercises.  -Patient would like to be evaluated by physical therapist at [facility], referral has been provided  -Patient is aware that carbidopa/levodopa therapy is available if his parkinsonian symptoms become worse.  -Patient agreed to follow-up with our clinic every 3 months for now and continue to be seen at [facility] probably once a year.  - The patient asked for a letter of full disability and we will provide this.      Patient seen and discussed with Dr. Dangelo.    Little Wolff MD  Movement Disorders fellow.    RTC: 3 months .    Summary of orders placed this encounter:  No orders of the defined types were placed in this encounter.      I, Mariano Massey, personally interviewed, examined and evaluated this patient on 7/31/2019.  I discussed the patient with Dr. Little Wolff and agree with the assessment, examination and plan of care documented by Dr. Wolff.  I personally reviewed the  vital signs, medications and labs/imaging.

## 2019-08-01 ENCOUNTER — DOCUMENTATION ONLY (OUTPATIENT)
Dept: NEUROLOGY | Facility: CLINIC | Age: 64
End: 2019-08-01

## 2019-08-01 NOTE — PROGRESS NOTES
Received FMLA paper work, forms were placed in provider folder for signature     Received FMLA paper work signed from provider, original forms were mailed to patient home, copy was sent to be scanned

## 2019-08-06 ENCOUNTER — TELEPHONE (OUTPATIENT)
Dept: NEUROLOGY | Facility: CLINIC | Age: 64
End: 2019-08-06

## 2019-08-06 NOTE — TELEPHONE ENCOUNTER
"[facility] Call Center    Phone Message    May a detailed message be left on voicemail: yes    Reason for Call: Other: Pt called because he recieved the disability letter that Dr. Massey wrote for him, but at the end of the letter donna seems to have corrected Dr. Massey's name to \"Mariano hilton MD\". Pt is wondering if he can get a corrected copy sent out to him.     Action Taken: Message routed to:  [facility] ([facility] Neurology  "

## 2019-08-07 ENCOUNTER — MYC MEDICAL ADVICE (OUTPATIENT)
Dept: NEUROLOGY | Facility: CLINIC | Age: 64
End: 2019-08-07

## 2019-08-12 ENCOUNTER — MYC MEDICAL ADVICE (OUTPATIENT)
Dept: NEUROLOGY | Facility: CLINIC | Age: 64
End: 2019-08-12

## 2019-08-22 ENCOUNTER — THERAPY VISIT (OUTPATIENT)
Dept: PHYSICAL THERAPY | Facility: CLINIC | Age: 64
End: 2019-08-22
Payer: COMMERCIAL

## 2019-08-22 DIAGNOSIS — G23.8 MULTIPLE SYSTEM ATROPHY (H): ICD-10-CM

## 2019-08-22 DIAGNOSIS — G90.3 MULTIPLE SYSTEM ATROPHY (H): ICD-10-CM

## 2019-08-22 PROCEDURE — 97162 PT EVAL MOD COMPLEX 30 MIN: CPT | Mod: GP | Performed by: PHYSICAL THERAPIST

## 2019-08-22 PROCEDURE — 97110 THERAPEUTIC EXERCISES: CPT | Mod: GP | Performed by: PHYSICAL THERAPIST

## 2019-08-22 NOTE — LETTER
[facility]  [address]  [facility] [id number] [phone]    2019    Re: Kobi Blanco   :   1955  MRN:  [id number]   REFERRING PHYSICIAN:   Mariano Massey    [facility]    Date of Initial Evaluation:  ***  Visits:  Rxs Used: 1  Reason for Referral:  Multiple system atrophy (H)    EVALUATION SUMMARY    [facility] Initial Evaluation  Subjective:  The history is provided by the patient. No  was used.   Kobi Blanco being seen for MSA related therapy.   Problem began 2019. Where condition occurred: at home and during recreation / sport.Problem occurred: Unknown   and reported as 5/10 on pain scale. General health as reported by patient is good. Pertinent medical history includes:  Sleep disorder/apnea.     Other surgery history details: Removal of wisdom teeth.  Current medications:  Cardiac medication.   Primary job tasks include:  Prolonged sitting.  Pain is described as burning and other (Fatigue) and is constant.  Since onset symptoms are gradually worsening.  Previous treatment includes physical therapy. There was moderate improvement following previous treatment.   Patient is None.   Barriers include:  None as reported by patient.  Red flags:  None as reported by patient.  Kobi Blanco is a 64 year old male with neurological deficit, deconditioning, history of previous falls, poor balance and weakness condition which occurred with fall.      This is a chronic condition Problem details: One of the biggest complications, stomach is beginning to distend and lower back muscles  Walking is difficult and taxing. Increased susceptibility to falling. Last Wednesday, fell over while changing the oil.   Flights of stairs in home which he has been climbing to keep LEs strong.   Blood pressure is also fluctuant and can get blood  "rush. Very conscious of his movements and how fast he moves as a result.   Occasional burning in legs after laying on legs at night.   Troubles with sleeping, waking and staying awake in the night. Middle of back will have \"tired\" sensation as well as occasions when lower back feels a burning sensation.    .      Patient reports pain:  Left lower extremity, right lower extremity and lumbar spine.         Associated symptoms:  Loss of strength, loss of balance and fatigue.   Symptoms are exacerbated by activity, ascending stairs, descending stairs and walking and relieved by rest.                              Strength and ROM:   Hip MMT  R: abd 4, flex 4, ext 3  L abd 4, flex 4, ext 3    Knee MMT:  R: flex 5, ext 5  L: flex 5, ext 5    Shoulder MMT:  R: flex 5, abd 5, ER 5, IR 5  L: flex 5, abd 5, ER 5, IR 5    AROM:   UE: WNL  LE: WNL    Special Tests:   Double Limb Lowerin%  30 sec sit<>stand: 10 reps  6 min walk test: 1326 ft 3 in (3-4/10 difficulty)    Ambulation: minimal upper trunk rotation/movement with walking, shortened stride length, and diminished step height as he slid heels prior to initial contact    Dysdiadokinesia: 55% rate compared to normal, symmetrical bilaterally, good control  Targeting nose to finger: 50% rate compared to normal, 90% accuracy, no overshooting or undershooting     Opposition: 50% rate compared normal, good control, symmetrical   Toe tappin% rate compared to normal, symmetrical     Assessment/Plan:    Patient is a 64 year old male with fatigue and deconditioning due to multiple systems atrophy.    Patient has the following significant findings with corresponding treatment plan.                Diagnosis 1:  Generalized weakness, fatigue, and deconditioning consistent with MSA  Decreased strength - therapeutic exercise and therapeutic activities  Impaired balance - neuro re-education, therapeutic activities and home program  Impaired gait - gait training and home " program  Impaired muscle performance - neuro re-education and home program  Decreased function - therapeutic activities and home program    Therapy Evaluation Codes:   1) History comprised of:   Personal factors that impact the plan of care:      Time since onset of symptoms.    Comorbidity factors that impact the plan of care are:      Sleep disorder/apnea.     Medications impacting care: Cardiac.  2) Examination of Body Systems comprised of:   Body structures and functions that impact the plan of care:      Lower extremities.   Activity limitations that impact the plan of care are:      Cooking, Stairs, Standing and Walking.  3) Clinical presentation characteristics are:   Evolving/Changing.  4) Decision-Making    Moderate complexity using standardized patient assessment instrument and/or measureable assessment of functional outcome.  Cumulative Therapy Evaluation is: Moderate complexity.    Previous and current functional limitations:  (See Goal Flow Sheet for this information)    Short term and Long term goals: (See Goal Flow Sheet for this information)     Communication ability:  Patient appears to be able to clearly communicate and understand verbal and written communication and follow directions correctly.  Treatment Explanation - The following has been discussed with the patient:   RX ordered/plan of care  Anticipated outcomes  Possible risks and side effects  This patient would benefit from PT intervention to resume normal activities.   Rehab potential is good.    Frequency:  1 X week, once daily  Duration:  for 10 weeks  Discharge Plan:  Achieve all LTG.  Independent in home treatment program.  Reach maximal therapeutic benefit.        PATRICE Zee, MS; TIGRE TolentinoT, OCS        Thank you for your referral.    INQUIRIES  Therapist:   [facility]  [address]  [facility] [address]  Phone: [phone]  Fax: [phone]

## 2019-08-22 NOTE — PROGRESS NOTES
"[facility] Initial Evaluation  Subjective:  The history is provided by the patient. No  was used.   Kobi Blanco being seen for MSA related therapy.   Problem began 7/31/2019. Where condition occurred: at home and during recreation / sport.Problem occurred: Unknown   and reported as 5/10 on pain scale. General health as reported by patient is good. Pertinent medical history includes:  Sleep disorder/apnea.     Other surgery history details: Removal of wisdom teeth.  Current medications:  Cardiac medication.   Primary job tasks include:  Prolonged sitting.  Pain is described as burning and other (Fatigue) and is constant.  Since onset symptoms are gradually worsening.  Previous treatment includes physical therapy. There was moderate improvement following previous treatment.   Patient is None.   Barriers include:  None as reported by patient.  Red flags:  None as reported by patient.  Kobi Blanco is a 64 year old male with neurological deficit, deconditioning, history of previous falls, poor balance and weakness condition which occurred with fall.      This is a chronic condition Problem details: One of the biggest complications, stomach is beginning to distend and lower back muscles  Walking is difficult and taxing. Increased susceptibility to falling. Last Wednesday, fell over while changing the oil.   Flights of stairs in home which he has been climbing to keep LEs strong.   Blood pressure is also fluctuant and can get blood rush. Very conscious of his movements and how fast he moves as a result.   Occasional burning in legs after laying on legs at night.   Troubles with sleeping, waking and staying awake in the night. Middle of back will have \"tired\" sensation as well as occasions when lower back feels a burning sensation.    .      Patient reports pain:  Left lower extremity, right lower extremity and lumbar spine.         Associated symptoms:  Loss of strength, " loss of balance and fatigue.   Symptoms are exacerbated by activity, ascending stairs, descending stairs and walking and relieved by rest.                              Strength and ROM:   Hip MMT  R: abd 4, flex 4, ext 3  L abd 4, flex 4, ext 3    Knee MMT:  R: flex 5, ext 5  L: flex 5, ext 5    Shoulder MMT:  R: flex 5, abd 5, ER 5, IR 5  L: flex 5, abd 5, ER 5, IR 5    AROM:   UE: WNL  LE: WNL    Special Tests:   Double Limb Lowerin%  30 sec sit<>stand: 10 reps  6 min walk test: 1326 ft 3 in (3-4/10 difficulty)    Ambulation: minimal upper trunk rotation/movement with walking, shortened stride length, and diminished step height as he slid heels prior to initial contact    Dysdiadokinesia: 55% rate compared to normal, symmetrical bilaterally, good control  Targeting nose to finger: 50% rate compared to normal, 90% accuracy, no overshooting or undershooting     Opposition: 50% rate compared normal, good control, symmetrical   Toe tappin% rate compared to normal, symmetrical     Assessment/Plan:    Patient is a 64 year old male with fatigue and deconditioning due to multiple systems atrophy.    Patient has the following significant findings with corresponding treatment plan.                Diagnosis 1:  Generalized weakness, fatigue, and deconditioning consistent with MSA  Decreased strength - therapeutic exercise and therapeutic activities  Impaired balance - neuro re-education, therapeutic activities and home program  Impaired gait - gait training and home program  Impaired muscle performance - neuro re-education and home program  Decreased function - therapeutic activities and home program    Therapy Evaluation Codes:   1) History comprised of:   Personal factors that impact the plan of care:      Time since onset of symptoms.    Comorbidity factors that impact the plan of care are:      Sleep disorder/apnea.     Medications impacting care: Cardiac.  2) Examination of Body Systems comprised of:   Body  structures and functions that impact the plan of care:      Lower extremities.   Activity limitations that impact the plan of care are:      Cooking, Stairs, Standing and Walking.  3) Clinical presentation characteristics are:   Evolving/Changing.  4) Decision-Making    Moderate complexity using standardized patient assessment instrument and/or measureable assessment of functional outcome.  Cumulative Therapy Evaluation is: Moderate complexity.    Previous and current functional limitations:  (See Goal Flow Sheet for this information)    Short term and Long term goals: (See Goal Flow Sheet for this information)     Communication ability:  Patient appears to be able to clearly communicate and understand verbal and written communication and follow directions correctly.  Treatment Explanation - The following has been discussed with the patient:   RX ordered/plan of care  Anticipated outcomes  Possible risks and side effects  This patient would benefit from PT intervention to resume normal activities.   Rehab potential is good.    Frequency:  1 X week, once daily  Duration:  for 10 weeks  Discharge Plan:  Achieve all LTG.  Independent in home treatment program.  Reach maximal therapeutic benefit.    Please refer to the daily flowsheet for treatment today, total treatment time and time spent performing 1:1 timed codes.     Kendrick Black, SPT, MS; Gerald Ramos, DPT, OCS

## 2019-08-27 ENCOUNTER — THERAPY VISIT (OUTPATIENT)
Dept: PHYSICAL THERAPY | Facility: CLINIC | Age: 64
End: 2019-08-27
Payer: COMMERCIAL

## 2019-08-27 DIAGNOSIS — G23.8 MULTIPLE SYSTEM ATROPHY (H): ICD-10-CM

## 2019-08-27 DIAGNOSIS — G90.3 MULTIPLE SYSTEM ATROPHY (H): ICD-10-CM

## 2019-08-27 PROCEDURE — 97112 NEUROMUSCULAR REEDUCATION: CPT | Mod: GP | Performed by: PHYSICAL THERAPIST

## 2019-08-27 PROCEDURE — 97110 THERAPEUTIC EXERCISES: CPT | Mod: GP | Performed by: PHYSICAL THERAPIST

## 2019-09-06 ENCOUNTER — THERAPY VISIT (OUTPATIENT)
Dept: PHYSICAL THERAPY | Facility: CLINIC | Age: 64
End: 2019-09-06
Payer: COMMERCIAL

## 2019-09-06 DIAGNOSIS — G23.8 MULTIPLE SYSTEM ATROPHY (H): ICD-10-CM

## 2019-09-06 DIAGNOSIS — G90.3 MULTIPLE SYSTEM ATROPHY (H): ICD-10-CM

## 2019-09-06 PROCEDURE — 97112 NEUROMUSCULAR REEDUCATION: CPT | Mod: GP | Performed by: PHYSICAL THERAPIST

## 2019-09-06 PROCEDURE — 97110 THERAPEUTIC EXERCISES: CPT | Mod: GP | Performed by: PHYSICAL THERAPIST

## 2019-09-12 ENCOUNTER — THERAPY VISIT (OUTPATIENT)
Dept: PHYSICAL THERAPY | Facility: CLINIC | Age: 64
End: 2019-09-12
Payer: COMMERCIAL

## 2019-09-12 DIAGNOSIS — G90.3 MULTIPLE SYSTEM ATROPHY (H): ICD-10-CM

## 2019-09-12 DIAGNOSIS — G23.8 MULTIPLE SYSTEM ATROPHY (H): ICD-10-CM

## 2019-09-12 PROCEDURE — 97530 THERAPEUTIC ACTIVITIES: CPT | Mod: GP | Performed by: PHYSICAL THERAPIST

## 2019-09-12 PROCEDURE — 97110 THERAPEUTIC EXERCISES: CPT | Mod: GP | Performed by: PHYSICAL THERAPIST

## 2019-10-03 ENCOUNTER — THERAPY VISIT (OUTPATIENT)
Dept: PHYSICAL THERAPY | Facility: CLINIC | Age: 64
End: 2019-10-03
Payer: COMMERCIAL

## 2019-10-03 DIAGNOSIS — G23.8 MULTIPLE SYSTEM ATROPHY (H): ICD-10-CM

## 2019-10-03 DIAGNOSIS — G90.3 MULTIPLE SYSTEM ATROPHY (H): ICD-10-CM

## 2019-10-03 PROCEDURE — 97112 NEUROMUSCULAR REEDUCATION: CPT | Mod: GP | Performed by: PHYSICAL THERAPIST

## 2019-10-03 PROCEDURE — 97530 THERAPEUTIC ACTIVITIES: CPT | Mod: GP | Performed by: PHYSICAL THERAPIST

## 2019-10-31 ENCOUNTER — THERAPY VISIT (OUTPATIENT)
Dept: PHYSICAL THERAPY | Facility: CLINIC | Age: 64
End: 2019-10-31
Payer: COMMERCIAL

## 2019-10-31 DIAGNOSIS — G90.3 MULTIPLE SYSTEM ATROPHY (H): ICD-10-CM

## 2019-10-31 DIAGNOSIS — G23.8 MULTIPLE SYSTEM ATROPHY (H): ICD-10-CM

## 2019-10-31 PROCEDURE — 97530 THERAPEUTIC ACTIVITIES: CPT | Mod: GP | Performed by: PHYSICAL THERAPIST

## 2019-10-31 PROCEDURE — 97110 THERAPEUTIC EXERCISES: CPT | Mod: GP | Performed by: PHYSICAL THERAPIST

## 2019-10-31 PROCEDURE — 97112 NEUROMUSCULAR REEDUCATION: CPT | Mod: GP | Performed by: PHYSICAL THERAPIST

## 2019-10-31 NOTE — PROGRESS NOTES
"Subjective:  HPI                    Objective:  System    Physical Exam    General     ROS    Assessment/Plan:    PROGRESS  REPORT    Progress reporting period is from 19 to 10/31/19.       SUBJECTIVE  Subjective changes noted by patient:   he was seen at [facility] for his his MSA, MD happy with what he is doing in PT, he is still getting really fatigued with exercise and activity, walking goes much better with using walking stick, he gets really fatigued doing 1 flight of stairs,     Current Pain level: 0/10.     Previous pain level was  NA Initial Pain level: 0/10.   Changes in function:  Yes (See Goal flowsheet attached for changes in current functional level)  Adverse reaction to treatment or activity: activity - pushing exercises or activity at home too hard    OBJECTIVE  Changes noted in objective findings:    Objective: 6\" walk test: 1371' (occasional heel drag), 30 sec sit<>stand: 15 reps   Gait assessment: occasional heel drag, poor trunk rotation and arm swing    Dysdiadokinesia: 75% rate compared to normal, symmetrical bilaterally, good control    Targeting nose to finger: 50% rate compared to normal, 90% accuracy, no overshooting or undershooting      Opposition: 70% rate compared normal, good control, symmetrical   Toe tappin% rate compared to normal, symmetrical     ASSESSMENT/PLAN  Updated problem list and treatment plan: Diagnosis 1:  Generalized weakness/fatigue related to MSA    Impaired balance - neuro re-education, therapeutic activities and home program  Impaired gait - gait training, assistive devices and home program  Decreased function - therapeutic activities and home program  STG/LTGs have been met or progress has been made towards goals:  Yes (See Goal flow sheet completed today.)  Assessment of Progress: The patient's condition is improving.  Self Management Plans:  Patient has been instructed in a home treatment program.  I have re-evaluated this patient and find that the nature, " scope, duration and intensity of the therapy is appropriate for the medical condition of the patient.  Kobi continues to require the following intervention to meet STG and LTG's:  PT    Recommendations:  This patient would benefit from continued therapy.     Frequency:  1 X week, once daily  Duration:  for 6 weeks        Please refer to the daily flowsheet for treatment today, total treatment time and time spent performing 1:1 timed codes.    Gerald Ramos,PT, DPT, OCS

## 2019-10-31 NOTE — LETTER
"[facility]  [address]  [facility] [id number] [phone]    2019    Re: Kobi Blanco   :   1955  MRN:  [id number]   REFERRING PHYSICIAN:   Mariano Massey    [facility] ATHLETIC [facility]    Date of Initial Evaluation:  ***  Visits:  Rxs Used: 6  Reason for Referral:  Multiple system atrophy (H)    EVALUATION SUMMARY    Subjective:  HPI                    Objective:  System    Physical Exam    General     ROS    Assessment/Plan:    PROGRESS  REPORT    Progress reporting period is from 19 to 10/31/19.       SUBJECTIVE  Subjective changes noted by patient:   he was seen at [facility] for his his MSA, MD happy with what he is doing in PT, he is still getting really fatigued with exercise and activity, walking goes much better with using walking stick, he gets really fatigued doing 1 flight of stairs,     Current Pain level: 0/10.     Previous pain level was  NA Initial Pain level: 0/10.   Changes in function:  Yes (See Goal flowsheet attached for changes in current functional level)  Adverse reaction to treatment or activity: activity - pushing exercises or activity at home too hard    OBJECTIVE  Changes noted in objective findings:    Objective: 6\" walk test: 1371' (occasional heel drag), 30 sec sit<>stand: 15 reps   Gait assessment: occasional heel drag, poor trunk rotation and arm swing    Dysdiadokinesia: 75% rate compared to normal, symmetrical bilaterally, good control    Targeting nose to finger: 50% rate compared to normal, 90% accuracy, no overshooting or undershooting      Opposition: 70% rate compared normal, good control, symmetrical   Toe tappin% rate compared to normal, symmetrical     ASSESSMENT/PLAN  Updated problem list and treatment plan: Diagnosis 1:  Generalized weakness/fatigue related to MSA    Impaired balance - neuro re-education, therapeutic activities and home " program  Impaired gait - gait training, assistive devices and home program  Decreased function - therapeutic activities and home program  STG/LTGs have been met or progress has been made towards goals:  Yes (See Goal flow sheet completed today.)  Assessment of Progress: The patient's condition is improving.  Self Management Plans:  Patient has been instructed in a home treatment program.  I have re-evaluated this patient and find that the nature, scope, duration and intensity of the therapy is appropriate for the medical condition of the patient.  Kobi continues to require the following intervention to meet STG and LTG's:  PT    Recommendations:  This patient would benefit from continued therapy.     Frequency:  1 X week, once daily  Duration:  for 6 weeks        Please refer to the daily flowsheet for treatment today, total treatment time and time spent performing 1:1 timed codes.    Gerald Ramos,PT, DPT, OCS          Thank you for your referral.    INQUIRIES  Therapist:   [facility]  [address]  [facility] [address]  Phone: [phone]  Fax: [phone]

## 2019-11-07 ENCOUNTER — THERAPY VISIT (OUTPATIENT)
Dept: PHYSICAL THERAPY | Facility: CLINIC | Age: 64
End: 2019-11-07
Payer: COMMERCIAL

## 2019-11-07 DIAGNOSIS — G90.3 MULTIPLE SYSTEM ATROPHY (H): ICD-10-CM

## 2019-11-07 DIAGNOSIS — G23.8 MULTIPLE SYSTEM ATROPHY (H): ICD-10-CM

## 2019-11-07 PROCEDURE — 97110 THERAPEUTIC EXERCISES: CPT | Mod: GP | Performed by: PHYSICAL THERAPIST

## 2019-11-07 PROCEDURE — 97112 NEUROMUSCULAR REEDUCATION: CPT | Mod: GP | Performed by: PHYSICAL THERAPIST

## 2019-11-07 PROCEDURE — 97530 THERAPEUTIC ACTIVITIES: CPT | Mod: GP | Performed by: PHYSICAL THERAPIST

## 2019-11-14 ENCOUNTER — THERAPY VISIT (OUTPATIENT)
Dept: PHYSICAL THERAPY | Facility: CLINIC | Age: 64
End: 2019-11-14
Payer: COMMERCIAL

## 2019-11-14 DIAGNOSIS — G90.3 MULTIPLE SYSTEM ATROPHY (H): ICD-10-CM

## 2019-11-14 DIAGNOSIS — G23.8 MULTIPLE SYSTEM ATROPHY (H): ICD-10-CM

## 2019-11-14 PROCEDURE — 97530 THERAPEUTIC ACTIVITIES: CPT | Mod: GP | Performed by: PHYSICAL THERAPIST

## 2019-11-14 PROCEDURE — 97110 THERAPEUTIC EXERCISES: CPT | Mod: GP | Performed by: PHYSICAL THERAPIST

## 2019-11-14 PROCEDURE — 97112 NEUROMUSCULAR REEDUCATION: CPT | Mod: GP | Performed by: PHYSICAL THERAPIST

## 2019-11-21 ENCOUNTER — THERAPY VISIT (OUTPATIENT)
Dept: PHYSICAL THERAPY | Facility: CLINIC | Age: 64
End: 2019-11-21
Payer: COMMERCIAL

## 2019-11-21 DIAGNOSIS — G90.3 MULTIPLE SYSTEM ATROPHY (H): ICD-10-CM

## 2019-11-21 DIAGNOSIS — G23.8 MULTIPLE SYSTEM ATROPHY (H): ICD-10-CM

## 2019-11-21 PROCEDURE — 97112 NEUROMUSCULAR REEDUCATION: CPT | Mod: GP | Performed by: PHYSICAL THERAPIST

## 2019-11-21 PROCEDURE — 97110 THERAPEUTIC EXERCISES: CPT | Mod: GP | Performed by: PHYSICAL THERAPIST

## 2019-11-21 PROCEDURE — 97530 THERAPEUTIC ACTIVITIES: CPT | Mod: GP | Performed by: PHYSICAL THERAPIST

## 2019-12-05 ENCOUNTER — THERAPY VISIT (OUTPATIENT)
Dept: PHYSICAL THERAPY | Facility: CLINIC | Age: 64
End: 2019-12-05
Payer: COMMERCIAL

## 2019-12-05 DIAGNOSIS — G90.3 MULTIPLE SYSTEM ATROPHY (H): ICD-10-CM

## 2019-12-05 DIAGNOSIS — G23.8 MULTIPLE SYSTEM ATROPHY (H): ICD-10-CM

## 2019-12-05 PROCEDURE — 97110 THERAPEUTIC EXERCISES: CPT | Mod: GP | Performed by: PHYSICAL THERAPIST

## 2019-12-05 PROCEDURE — 97530 THERAPEUTIC ACTIVITIES: CPT | Mod: GP | Performed by: PHYSICAL THERAPIST

## 2019-12-05 NOTE — PROGRESS NOTES
"Subjective:  HPI                    Objective:  System    Physical Exam    General     ROS    Assessment/Plan:    PROGRESS  REPORT    Progress reporting period is from 10/31/19 to 19.       SUBJECTIVE  Subjective changes noted by patient:  he is progressing slowly, walking continues to be a challenge (especially if walking ~10min), he gets really fatigued with vacuuming one level of house, stairs cause him to get weaker quickly, still able to do reciporcal pattern on stairs with use of hand rails, things are hardest in the evening    Current Pain level: 0/10.     Previous pain level was  0/10 Initial Pain level: 0/10.   Changes in function:  Yes (See Goal flowsheet attached for changes in current functional level)  Adverse reaction to treatment or activity: pushing activity too hard    OBJECTIVE  Changes noted in objective findings:    Objective: 6\" walk test: 1359' (heel drag only noted with turning) difficulty 4/10, iso bridge: 3:00/3:00 difficulty 2/10, 30 sec sit<>stand: 18 reps     Gait assessment: frequent heel drag, poor trunk rotation and fair arm swing     Dysdiadokinesia: 75% rate compared to normal, symmetrical bilaterally, good control     Targeting nose to finger: 50% rate compared to normal, 90% accuracy, no overshooting or undershooting      Opposition: 70% rate compared normal, good control, symmetrical   Toe tappin% rate compared to normal, symmetrical     ASSESSMENT/PLAN  Updated problem list and treatment plan: Diagnosis 1:  Generalized weakness/fatigue related to MSA    Decreased strength - therapeutic exercise, therapeutic activities and home program  Impaired balance - neuro re-education, therapeutic activities and home program  Decreased function - therapeutic activities and home program  STG/LTGs have been met or progress has been made towards goals:  Yes (See Goal flow sheet completed today.)  Assessment of Progress: The patient's condition is improving.  Self Management Plans:  " Patient has been instructed in a home treatment program.  I have re-evaluated this patient and find that the nature, scope, duration and intensity of the therapy is appropriate for the medical condition of the patient.  Kobi continues to require the following intervention to meet STG and LTG's:  PT    Recommendations:  This patient would benefit from continued therapy.     Frequency:  1 X week, once daily  Duration:  for 4 weeks        Please refer to the daily flowsheet for treatment today, total treatment time and time spent performing 1:1 timed codes.    Gerald Ramos,PT, DPT, OCS

## 2019-12-12 ENCOUNTER — THERAPY VISIT (OUTPATIENT)
Dept: PHYSICAL THERAPY | Facility: CLINIC | Age: 64
End: 2019-12-12
Payer: COMMERCIAL

## 2019-12-12 DIAGNOSIS — G90.3 MULTIPLE SYSTEM ATROPHY (H): ICD-10-CM

## 2019-12-12 DIAGNOSIS — G23.8 MULTIPLE SYSTEM ATROPHY (H): ICD-10-CM

## 2019-12-12 PROCEDURE — 97530 THERAPEUTIC ACTIVITIES: CPT | Mod: GP | Performed by: PHYSICAL THERAPIST

## 2019-12-12 PROCEDURE — 97110 THERAPEUTIC EXERCISES: CPT | Mod: GP | Performed by: PHYSICAL THERAPIST

## 2019-12-14 ASSESSMENT — ENCOUNTER SYMPTOMS
LEG PAIN: 0
JAUNDICE: 0
PANIC: 0
NECK MASS: 0
TREMORS: 0
DIFFICULTY URINATING: 1
HALLUCINATIONS: 0
WEIGHT GAIN: 1
NAUSEA: 0
COUGH: 0
SINUS CONGESTION: 0
ARTHRALGIAS: 0
EYE WATERING: 1
NIGHT SWEATS: 0
ALTERED TEMPERATURE REGULATION: 0
TASTE DISTURBANCE: 0
JOINT SWELLING: 0
LIGHT-HEADEDNESS: 0
HEARTBURN: 1
COUGH DISTURBING SLEEP: 0
PARALYSIS: 0
DYSPNEA ON EXERTION: 1
MUSCLE WEAKNESS: 1
SORE THROAT: 0
SMELL DISTURBANCE: 0
ORTHOPNEA: 0
HOARSE VOICE: 1
SPUTUM PRODUCTION: 0
INCREASED ENERGY: 1
BACK PAIN: 1
STIFFNESS: 0
VOMITING: 0
HYPERTENSION: 1
BLOATING: 1
DECREASED APPETITE: 1
INSOMNIA: 1
HEMATURIA: 0
MUSCLE CRAMPS: 1
WEAKNESS: 1
PALPITATIONS: 0
WHEEZING: 0
SYNCOPE: 0
LOSS OF CONSCIOUSNESS: 0
DEPRESSION: 0
FATIGUE: 1
SLEEP DISTURBANCES DUE TO BREATHING: 0
SHORTNESS OF BREATH: 1
MEMORY LOSS: 0
HEADACHES: 0
DISTURBANCES IN COORDINATION: 1
HYPOTENSION: 1
EXERCISE INTOLERANCE: 1
POLYDIPSIA: 0
FEVER: 0
DECREASED CONCENTRATION: 0
NECK PAIN: 0
ABDOMINAL PAIN: 0
EYE PAIN: 0
DIZZINESS: 1
SPEECH CHANGE: 0
NERVOUS/ANXIOUS: 0
SEIZURES: 0
SNORES LOUDLY: 1
EYE REDNESS: 0
DYSURIA: 0
TROUBLE SWALLOWING: 0
BOWEL INCONTINENCE: 0
RECTAL PAIN: 0
CHILLS: 0
POLYPHAGIA: 0
TINGLING: 0
MYALGIAS: 1
DIARRHEA: 0
BLOOD IN STOOL: 0
FLANK PAIN: 0
NUMBNESS: 0
POSTURAL DYSPNEA: 0
EYE IRRITATION: 0
DOUBLE VISION: 0
SINUS PAIN: 0
HEMOPTYSIS: 0
CONSTIPATION: 1
WEIGHT LOSS: 0

## 2019-12-17 ENCOUNTER — OFFICE VISIT (OUTPATIENT)
Dept: NEUROLOGY | Facility: CLINIC | Age: 64
End: 2019-12-17
Payer: COMMERCIAL

## 2019-12-17 VITALS
WEIGHT: 206 LBS | OXYGEN SATURATION: 97 % | DIASTOLIC BLOOD PRESSURE: 121 MMHG | RESPIRATION RATE: 16 BRPM | BODY MASS INDEX: 27.3 KG/M2 | HEIGHT: 73 IN | HEART RATE: 64 BPM | SYSTOLIC BLOOD PRESSURE: 173 MMHG

## 2019-12-17 DIAGNOSIS — G23.2 MULTIPLE SYSTEM ATROPHY P (H): Primary | ICD-10-CM

## 2019-12-17 PROBLEM — G47.00 INSOMNIA: Status: ACTIVE | Noted: 2019-10-10

## 2019-12-17 PROBLEM — G20.C PARKINSONISM (H): Status: ACTIVE | Noted: 2019-05-30

## 2019-12-17 PROBLEM — G62.9 NEUROPATHY, PERIPHERAL: Status: ACTIVE | Noted: 2019-05-30

## 2019-12-17 PROBLEM — N31.2 ATONIC BLADDER: Status: ACTIVE | Noted: 2019-05-30

## 2019-12-17 PROBLEM — G47.52 REM SLEEP BEHAVIOR DISORDER: Status: ACTIVE | Noted: 2019-05-30

## 2019-12-17 PROBLEM — R41.3 AMNESIA: Status: ACTIVE | Noted: 2019-05-30

## 2019-12-17 PROBLEM — K59.00 CONSTIPATION: Status: ACTIVE | Noted: 2019-10-10

## 2019-12-17 RX ORDER — CARBIDOPA AND LEVODOPA 25; 100 MG/1; MG/1
TABLET ORAL
Qty: 100 TABLET | Refills: 3 | Status: SHIPPED | OUTPATIENT
Start: 2019-12-17 | End: 2020-01-28

## 2019-12-17 ASSESSMENT — UNIFIED PARKINSONS DISEASE RATING SCALE (UPDRS)
TOTAL_SCORE: 17
TOETAPPING_RIGHT: NORMAL
SPONTANEITY_OF_MOVEMENT: 0: NORMAL.  NO PROBLEMS.
FINGER_TAPPING_LEFT: MILD: ANY OF THE FOLLOWING: A) 3 TO 5 INTERRUPTIONS DURING TAPPING B) MILD SLOWING C) THE AMPLITUDE DECREMENTS MIDWAY IN THE 10-MOVEMENT SEQUENCE
RIGIDITY_NECK: SLIGHT: RIGIDITY ONLY DETECTED WITH ACTIVATION MANEUVER.
FINGER_TAPPING_RIGHT: MILD: ANY OF THE FOLLOWING: A) 3 TO 5 INTERRUPTIONS DURING TAPPING B) MILD SLOWING C) THE AMPLITUDE DECREMENTS MIDWAY IN THE 10-MOVEMENT SEQUENCE
HANDMOVEMENTS_LEFT: SLIGHT: ANY OF THE FOLLOWING: A) THE REGULAR RHYTHM IS BROKEN WITH ONE WITH ONE OR TWO INTERRUPTIONS OR HESITATIONS OF THE MOVEMENT B) SLIGHT SLOWING C) THE AMPLITUDE DECREMENTS NEAR THE END OF THE 10 MOVEMENTS.
CONSTANCY_TREMOR_ATREST: NORMAL: NO TREMOR.
AXIAL_SCORE: 10
FACIAL_EXPRESSION: NORMAL.
PRONATION_SUPINATION_LEFT: NORMAL
LEG_AGILITY_RIGHT: NORMAL
POSTURE: 1 SLIGHT.  NOT QUITE ERECT BUT COULD BE NORMAL FOR OLDER PERSONS.
GAIT: MODERATE: REQUIRES AN ASSISTANCE DEVICE FOR SAFE WALKING (WALKING STICK, WALKER) BUT NOT A PERSON.
PRONATION_SUPINATION_RIGHT: NORMAL
AMPLITUDE_LLE: NORMAL: NO TREMOR.
AMPLITUDE_LUE: NORMAL: NO TREMOR.
FREEZING_GAIT: NORMAL
RIGIDITY_RUE: SLIGHT: RIGIDITY ONLY DETECTED WITH ACTIVATION MANEUVER.
AMPLITUDE_RUE: NORMAL: NO TREMOR.
ARISING_CHAIR: MILD:  PUSHES SELF UP FROM ARMS OF CHAIR WITHOUT DIFFICULTY.
AMPLITUDE_RLE: NORMAL: NO TREMOR.
AMPLITUDE_LIP_JAW: NORMAL: NO TREMOR.
TOTAL_SCORE: 4
TOTAL_SCORE_LEFT: 3
LEG_AGILITY_LEFT: NORMAL
POSTURAL_STABILITY: MILD:  MORE THAN 5 STEPS, BUT SUBJECT RECOVERS UNAIDED.
PARKINSONS_MEDS: OFF
RIGIDITY_LLE: NORMAL
RIGIDITY_RLE: NORMAL
SPEECH: SLIGHT: LOSS OF MODULATION, DICTION OR VOLUME, BUT STILL ALL WORDS EASY TO UNDERSTAND.
RIGIDITY_LUE: NORMAL
HANDMOVEMENTS_RIGHT: SLIGHT: ANY OF THE FOLLOWING: A) THE REGULAR RHYTHM IS BROKEN WITH ONE WITH ONE OR TWO INTERRUPTIONS OR HESITATIONS OF THE MOVEMENT B) SLIGHT SLOWING C) THE AMPLITUDE DECREMENTS NEAR THE END OF THE 10 MOVEMENTS.
TOETAPPING_LEFT: NORMAL

## 2019-12-17 ASSESSMENT — PAIN SCALES - GENERAL: PAINLEVEL: NO PAIN (0)

## 2019-12-17 ASSESSMENT — MIFFLIN-ST. JEOR: SCORE: 1783.29

## 2019-12-17 NOTE — LETTER
12/17/2019       RE: Kobi Blanco  [address] [place] [address]     Dear Colleague,    Thank you for referring your patient, Kobi Blanco, to the [facility] NEUROLOGY at [facility]. Please see a copy of my visit note below.    Movement Disorder Clinic follow up note    Patient: Kobi Blanco  Medical Record Number: [id number]  Encounter Date: December 17, 2019  PCP:[facility]    CC: Multiple system atrophy    Impression:  1.  Multiple system atrophy  2.  Hypertension  3.  Parkinsonism secondary to 1.-Worsening  4.  Diastases recti with abdominal hernia    Recommendations:  1.  Trial of carbidopa/levodopa, 25/100, 1 tablet 3 times a day on an empty stomach.  After 2 weeks increase to 1 tablet 3 times a day.  Call in 1 month with report about medication effect.  Warned of possible nausea.  2.  Continue to communicate with the [facility] about possible drug trial for MSA  3.  Do not discontinue antihypertensive  4.  Would not recommend surgical repair of his abdominal hernia    Return to clinic: 6 months    Interval Hx:: Mr. Kobi Blanco returns to clinic today for follow-up of his multiple system atrophy.  Please recall that he was diagnosed with MSA at the [facility].  He comes here for longitudinal follow-up.    On October 10 of 2019 she went to the [facility] in [place] and saw Dr. Steph Whitlock who is an expert in MSA.  He was seen in the [facility] MSA Specialty clinic.  I read through Dr. Whitlock's expert consultation.  The patient was sent to Dr. López in sleep.  It was felt that the patient might have mild stridor.  CPAP was recommended.  The patient had evaluation of his vocal cords by Ms. Hoffman.  There was weakness of the left vocal cord that needs longitudinal follow-up.  The patient was introduced to the possibility of a drug trial for MSA.  He is going to go down for evaluation for this and think  "about entering this trial.    He is concerned that his blood pressure remains high.  It is in the 170 systolic range lying standing and sitting.  Its highest in the sitting position.  He does not have orthostatics him.  Dr. Whitlock had gone over measures for orthostatic hypotension but I do not feel any need to be instituted at this time.  His home doctor had talked about stopping his amlodipine but I think he should be on an antihypertensive.    His parkinsonism is worse.  He is walking more slowly and is afraid of falling.  Part of the gait problem was ataxia but his current problem sounds more parkinsonian as he is shuffling and occasionally freezes.  He has no resting tremor.  He has micrographia.  He does not have marked problems with other fine motor activities of his hands.    He continues with urinary incontinence for which she uses catheterization.  He has not had any recent infection.    He is concerned that his stomach muscles seem to be weak.  He has bulging of stomach muscles.  He is trying to diet but that does not help the bulging feeling.    Current medications    Movement Disorder-related Medications                                                                                                                                                             Current Outpatient Medications   Medication     amLODIPine (NORVASC) 5 MG tablet     carbidopa-levodopa (SINEMET)  MG tablet     Cholecalciferol (D3-1000) 1000 units CAPS     multivitamin (DEKAS ESSENTIAL) capsule     Omega-3 1000 MG capsule     Psyllium-Calcium (METAMUCIL PLUS CALCIUM) CAPS     No current facility-administered medications for this visit.        Examination:  BP (!) 173/121   Pulse 64   Resp 16   Ht 1.854 m (6' 1\")   Wt 93.4 kg (206 lb)   SpO2 97%   BMI 27.18 kg/m     General- no distress, no rash, good pulses, no edema  Respiratory-auscultation over the anterior lung fields is clear  Cardiac- regular rate and " rhythm    Neurologic  Mental status  Patient is alert, appropriate, speech is fluent and comprehension is intact    Cranial nerve testing  Pupils are equal and reactive to light, visual fields are full to confrontation bilaterally  Extraocular movements are full no saccadic pursuit  Facial sensation is intact, face is symmetric with rest and activation  Palate rises symmetrically, tongue protrudes at midline with normal movements  Sterocleidomastoid and trapezius strength is normal and symmetric    Motor  UPDRS Values 7/31/2019 12/17/2019   Time: 5:02 PM 8:45 AM   Medication Off Off   R Brain DBS: None None   L Brain DBS: None None   Speech 2 1   Facial Expression 2 0   Rigidity Neck 0 1   Rigidity RUE 1 1   Rigidity LUE 0 0   Rigidity RLE 0 0   Rigidity LLE 0 0   Finger Taps R 1 2   Finger Taps L 1 2   Hand Mvt R 1 1   Hand Mvt L 1 1   Pron-/Supinate R 0 0   Pron-/Supinate L 0 0   Toe Tap R 0 0   Toe Tap L 0 0   Leg Agility R 0 0   Leg Agility L 0 0   Arise From Chair 0 2   Gait 1 3   Gait Freezing 0 0   Postural Stability 3 2   Posture 0 1   Global Spont Mvt 0 0   Postural Tremor RUE 0 0   Postural Tremor LUE 0 0   Kinetic Tremor RUE 0 0   Kinetic Tremor LUE 0 0   Rest Tremor RUE 0 0   Rest Tremor LUE 0 0   Rest Tremor RLE 0 0   Rest Tremor LLE 0 0   Rest Tremor Lip/Jaw 0 0   Rest Tremor Constancy 0 0   Total Right 3 4   Total Left 2 3   Axial Total 8 10   Total 13 17       Sensation  Intact to pin, vibration   Proprioception intact in great toes and fingers    Tendon reflexes  Testing at brachioradialis, biceps, triceps, patella and achilles bilaterally showed normal reflexes, symmetrically, without Babinski or Garcia signs    Coordination  Finger nose finger testing: normalRapid alternating movements are normal.  Shuffling gait  Small steps    Diastases recti noted with bulging abdominal hernia.    25 minutes of total time was spent face-to-face with the patient over 50% of which was counseling..    Again,  thank you for allowing me to participate in the care of your patient.      Sincerely,    Mariano Massey MD

## 2019-12-17 NOTE — PROGRESS NOTES
Movement Disorder Clinic follow up note    Patient: Kobi Blanco  Medical Record Number: [id number]  Encounter Date: December 17, 2019  PCP:[facility]    CC: Multiple system atrophy    Impression:  1.  Multiple system atrophy  2.  Hypertension  3.  Parkinsonism secondary to 1.-Worsening  4.  Diastases recti with abdominal hernia    Recommendations:  1.  Trial of carbidopa/levodopa, 25/100, 1 tablet 3 times a day on an empty stomach.  After 2 weeks increase to 1 tablet 3 times a day.  Call in 1 month with report about medication effect.  Warned of possible nausea.  2.  Continue to communicate with the [facility] about possible drug trial for MSA  3.  Do not discontinue antihypertensive  4.  Would not recommend surgical repair of his abdominal hernia    Return to clinic: 6 months    Interval Hx:: Mr. Kobi Blanco returns to clinic today for follow-up of his multiple system atrophy.  Please recall that he was diagnosed with MSA at the [facility].  He comes here for longitudinal follow-up.    On October 10 of 2019 she went to the [facility] in [place] and saw Dr. Steph Whitlock who is an expert in MSA.  He was seen in the [facility] MSA Specialty clinic.  I read through Dr. Whitlock's expert consultation.  The patient was sent to Dr. López in sleep.  It was felt that the patient might have mild stridor.  CPAP was recommended.  The patient had evaluation of his vocal cords by Ms. Hoffman.  There was weakness of the left vocal cord that needs longitudinal follow-up.  The patient was introduced to the possibility of a drug trial for MSA.  He is going to go down for evaluation for this and think about entering this trial.    He is concerned that his blood pressure remains high.  It is in the 170 systolic range lying standing and sitting.  Its highest in the sitting position.  He does not have orthostatics him.  Dr. Whitlock had gone over measures for orthostatic hypotension but I do  "not feel any need to be instituted at this time.  His home doctor had talked about stopping his amlodipine but I think he should be on an antihypertensive.    His parkinsonism is worse.  He is walking more slowly and is afraid of falling.  Part of the gait problem was ataxia but his current problem sounds more parkinsonian as he is shuffling and occasionally freezes.  He has no resting tremor.  He has micrographia.  He does not have marked problems with other fine motor activities of his hands.    He continues with urinary incontinence for which she uses catheterization.  He has not had any recent infection.    He is concerned that his stomach muscles seem to be weak.  He has bulging of stomach muscles.  He is trying to diet but that does not help the bulging feeling.    Current medications    Movement Disorder-related Medications                                                                                                                                                             Current Outpatient Medications   Medication     amLODIPine (NORVASC) 5 MG tablet     carbidopa-levodopa (SINEMET)  MG tablet     Cholecalciferol (D3-1000) 1000 units CAPS     multivitamin (DEKAS ESSENTIAL) capsule     Omega-3 1000 MG capsule     Psyllium-Calcium (METAMUCIL PLUS CALCIUM) CAPS     No current facility-administered medications for this visit.        Examination:  BP (!) 173/121   Pulse 64   Resp 16   Ht 1.854 m (6' 1\")   Wt 93.4 kg (206 lb)   SpO2 97%   BMI 27.18 kg/m     General- no distress, no rash, good pulses, no edema  Respiratory-auscultation over the anterior lung fields is clear  Cardiac- regular rate and rhythm    Neurologic  Mental status  Patient is alert, appropriate, speech is fluent and comprehension is intact    Cranial nerve testing  Pupils are equal and reactive to light, visual fields are full to confrontation bilaterally  Extraocular movements are full no saccadic pursuit  Facial sensation is " intact, face is symmetric with rest and activation  Palate rises symmetrically, tongue protrudes at midline with normal movements  Sterocleidomastoid and trapezius strength is normal and symmetric    Motor  UPDRS Values 7/31/2019 12/17/2019   Time: 5:02 PM 8:45 AM   Medication Off Off   R Brain DBS: None None   L Brain DBS: None None   Speech 2 1   Facial Expression 2 0   Rigidity Neck 0 1   Rigidity RUE 1 1   Rigidity LUE 0 0   Rigidity RLE 0 0   Rigidity LLE 0 0   Finger Taps R 1 2   Finger Taps L 1 2   Hand Mvt R 1 1   Hand Mvt L 1 1   Pron-/Supinate R 0 0   Pron-/Supinate L 0 0   Toe Tap R 0 0   Toe Tap L 0 0   Leg Agility R 0 0   Leg Agility L 0 0   Arise From Chair 0 2   Gait 1 3   Gait Freezing 0 0   Postural Stability 3 2   Posture 0 1   Global Spont Mvt 0 0   Postural Tremor RUE 0 0   Postural Tremor LUE 0 0   Kinetic Tremor RUE 0 0   Kinetic Tremor LUE 0 0   Rest Tremor RUE 0 0   Rest Tremor LUE 0 0   Rest Tremor RLE 0 0   Rest Tremor LLE 0 0   Rest Tremor Lip/Jaw 0 0   Rest Tremor Constancy 0 0   Total Right 3 4   Total Left 2 3   Axial Total 8 10   Total 13 17       Sensation  Intact to pin, vibration   Proprioception intact in great toes and fingers    Tendon reflexes  Testing at brachioradialis, biceps, triceps, patella and achilles bilaterally showed normal reflexes, symmetrically, without Babinski or Garcia signs    Coordination  Finger nose finger testing: normalRapid alternating movements are normal.  Shuffling gait  Small steps    Diastases recti noted with bulging abdominal hernia.    25 minutes of total time was spent face-to-face with the patient over 50% of which was counseling..        Answers for HPI/ROS submitted by the patient on 12/14/2019   General Symptoms: Yes  Skin Symptoms: No  HENT Symptoms: Yes  EYE SYMPTOMS: Yes  HEART SYMPTOMS: Yes  LUNG SYMPTOMS: Yes  INTESTINAL SYMPTOMS: Yes  URINARY SYMPTOMS: Yes  REPRODUCTIVE SYMPTOMS: Yes  SKELETAL SYMPTOMS: Yes  BLOOD SYMPTOMS: No  NERVOUS  SYSTEM SYMPTOMS: Yes  MENTAL HEALTH SYMPTOMS: Yes  Fever: No  Loss of appetite: Yes  Weight loss: No  Weight gain: Yes  Fatigue: Yes  Night sweats: No  Chills: No  Increased stress: No  Excessive hunger: No  Excessive thirst: No  Feeling hot or cold when others believe the temperature is normal: No  Loss of height: No  Post-operative complications: No  Surgical site pain: No  Hallucinations: No  Change in or Loss of Energy: Yes  Hyperactivity: No  Confusion: No  Ear pain: No  Ear discharge: No  Hearing loss: No  Tinnitus: Yes  Nosebleeds: No  Congestion: No  Sinus pain: No  Trouble swallowing: No   Voice hoarseness: Yes  Mouth sores: No  Sore throat: No  Tooth pain: No  Gum tenderness: No  Bleeding gums: No  Change in taste: No  Change in sense of smell: No  Dry mouth: No  Hearing aid used: No  Neck lump: No  Eye pain: No  Vision loss: No  Dry eyes: Yes  Watery eyes: Yes  Eye bulging: No  Double vision: No  Flashing of lights: No  Spots: No  Floaters: No  Redness: No  Crossed eyes: No  Tunnel Vision: No  Yellowing of eyes: No  Eye irritation: No  Cough: No  Sputum or phlegm: No  Coughing up blood: No  Difficulty breating or shortness of breath: Yes  Snoring: Yes  Wheezing: No  Difficulty breathing on exertion: Yes  Nighttime Cough: No  Difficulty breathing when lying flat: No  Chest pain or pressure: No  Fast or irregular heartbeat: No  Pain in legs with walking: No  Trouble breathing while lying down: No  Fingers or toes appear blue: No  High blood pressure: Yes  Low blood pressure: Yes  Fainting: No  Murmurs: No  Pacemaker: No  Varicose veins: No  Edema or swelling: No  Wake up at night with shortness of breath: No  Light-headedness: No  Exercise intolerance: Yes  Heart burn or indigestion: Yes  Nausea: No  Vomiting: No  Abdominal pain: No  Bloating: Yes  Constipation: Yes  Diarrhea: No  Blood in stool: No  Black stools: No  Rectal or Anal pain: No  Fecal incontinence: No  Yellowing of skin or eyes: No  Vomit with  blood: No  Change in stools: No  Trouble holding urine or incontinence: No  Pain or burning: No  Trouble starting or stopping: No  Increased frequency of urination: No  Blood in urine: No  Decreased frequency of urination: No  Frequent nighttime urination: No  Flank pain: No  Difficulty emptying bladder: Yes  Back pain: Yes  Muscle aches: Yes  Neck pain: No  Swollen joints: No  Joint pain: No  Bone pain: No  Muscle cramps: Yes  Muscle weakness: Yes  Joint stiffness: No  Bone fracture: No  Trouble with coordination: Yes  Dizziness or trouble with balance: Yes  Fainting or black-out spells: No  Memory loss: No  Headache: No  Seizures: No  Speech problems: No  Tingling: No  Tremor: No  Weakness: Yes  Difficulty walking: Yes  Paralysis: No  Numbness: No  Scrotal pain or swelling: No  Erectile dysfunction: Yes  Penile discharge: No  Genital ulcers: No  Reduced libido: Yes  Nervous or Anxious: No  Depression: No  Trouble sleeping: Yes  Trouble thinking or concentrating: No  Mood changes: No  Panic attacks: No

## 2019-12-17 NOTE — PATIENT INSTRUCTIONS
We are going to initiate a trial of carbidopa-levodopa (L-dopa, Sinemet) as follows:    1.  Start by taking one-half a carbidopa/levodopa tablet (yellow color) three times a day on an empty stomach.   The best way to do this is to take the medication one-half to one hour before each meal.  It does not need to be spaced eight hours apart.  We give the medication on an empty stomach because protein in food may block the absorption of the medication.    2.  After two weeks, increase the dose to one full tablet three times a day.     3.  The medication may cause nausea after each dose.  This will usually wear off with time.  Do not increase the dose if you are having nausea.    4.  Call if side-effects are severe or lasting.We are going to initiate a trial of carbidopa-levodopa (L-dopa, Sinemet) as follows:    1.  Start by taking one-half a carbidopa/levodopa tablet (yellow color) three times a day on an empty stomach.   The best way to do this is to take the medication one-half to one hour before each meal.  It does not need to be spaced eight hours apart.  We give the medication on an empty stomach because protein in food may block the absorption of the medication.    2.  After two weeks, increase the dose to one full tablet three times a day.  If you are completely better you don't need to increase further.  If you still have symptoms increase to 1.5 tablets three times a day after two weeks.    3.  The medication may cause nausea after each dose.  This will usually wear off with time.  Do not increase the dose if you are having nausea.    4.  Call if side-effects are severe or lasting.

## 2019-12-17 NOTE — NURSING NOTE
Chief Complaint   Patient presents with     RECHECK     [facility] RETURN MOVEMENT DISORDER MUTIPLE SYSTEM ATROPHY F/U        Ashu Cazares, EMT

## 2020-01-28 ENCOUNTER — TELEPHONE (OUTPATIENT)
Dept: NEUROLOGY | Facility: CLINIC | Age: 65
End: 2020-01-28

## 2020-01-28 NOTE — TELEPHONE ENCOUNTER
"Spoke to Ion. He wanted to pass along that he started with 1/2 tab carbidopa/levodopa  mg three times per day for 2 weeks, then he got to the point of 1 tab three times per day. His body did not like this. He quit the medication cold turkey. He feels good without it.    He did not \"really\" feel a benefit when he was on 1/2 tab three times per day. He does not quite understand why he was on it in the first place.    He received a phone call from [facility], he will see them in mid April for their MSA clinic with Dr. Whitlock. They will do a sleep study and check vocal chords.    I asked him to have [facility] send us all the records. He thanked me for the return call. I updated his medication list.  "

## 2020-01-28 NOTE — TELEPHONE ENCOUNTER
Health Call Center    Phone Message    May a detailed message be left on voicemail: yes    Reason for Call: Medication Question or concern regarding medication   Prescription Clarification  Name of Medication: carbidopa-levodopa (SINEMET)  MG tablet 100 tablet 3 12/17/2019  Prescribing Provider: Mariano Donis MD   Pharmacy: [id number] [address] AT [facility] [place] [place] [address]   What on the order needs clarification? Pt, Ion, stopped taking the medication beginning of January/End of December.  He was taking the 1/2 dose and it was tolerable.  When increased to the 1 tablet 3X a day, he was experiencing symptoms: overall tiredness/ lethargic.  Feels better while not taking it    Comments: Please call [phone] for discussion.    Action Taken: Message routed to:  [facility] ([facility]): Neurology

## 2020-02-10 PROBLEM — G90.3 MULTIPLE SYSTEM ATROPHY (H): Status: RESOLVED | Noted: 2019-08-22 | Resolved: 2020-02-10

## 2020-02-10 PROBLEM — G23.8 MULTIPLE SYSTEM ATROPHY (H): Status: RESOLVED | Noted: 2019-08-22 | Resolved: 2020-02-10

## 2020-02-10 NOTE — PROGRESS NOTES
Discharge Note    Progress reporting period is from last progress note on 12/05/19 to Dec 12, 2019.    Kobi failed to follow up and current status is unknown.  Please see information below for last relevant information on current status.  Patient seen for 11 visits.    SUBJECTIVE  Subjective changes noted by patient:  he continues to have good and bad days, he adams have some pain in his low back if he is on his feet too long, stairs continue to be a little bit of a challege, he has not needed to rely on his arms on handrails to go up stairs this week, he has had a couple of nights where he hasn't been able to sleep well due to some soreness around his hips  .  Current pain level is 0/10.     Previous pain level was  0/10.   Changes in function:  Yes (See Goal flowsheet attached for changes in current functional level)  Adverse reaction to treatment or activity: None    OBJECTIVE  Changes noted in objective findings: 6' walk test: 1396' (heel drag only noted with turning) difficulty 2/10, good weight shift and control with feet unsupport and reaching right/left     ASSESSMENT/PLAN  Diagnosis: Multiple systems atrophy   Updated problem list and treatment plan:   Decreased function - HEP  STG/LTGs have been met or progress has been made towards goals:  Yes, please see goal flowsheet for most current information  Assessment of Progress: current status is unknown.    Last current status: Pt is progressing as expected   Self Management Plans:  HEP  I have re-evaluated this patient and find that the nature, scope, duration and intensity of the therapy is appropriate for the medical condition of the patient.  Kobi continues to require the following intervention to meet STG and LTG's:  HEP.    Recommendations:  Discharge with current home program.  Patient to follow up with MD as needed.    Please refer to the daily flowsheet for treatment today, total treatment time and time spent performing 1:1 timed codes.    Gerald  Olmscheid,PT, DPT, OCS

## 2020-03-01 ENCOUNTER — HEALTH MAINTENANCE LETTER (OUTPATIENT)
Age: 65
End: 2020-03-01

## 2020-06-05 ASSESSMENT — ENCOUNTER SYMPTOMS
EYE PAIN: 0
HYPERTENSION: 1
POSTURAL DYSPNEA: 0
EYE REDNESS: 0
HEARTBURN: 1
DIARRHEA: 0
SPUTUM PRODUCTION: 0
NECK PAIN: 0
ABDOMINAL PAIN: 0
SPEECH CHANGE: 1
STIFFNESS: 0
SEIZURES: 0
ARTHRALGIAS: 1
JOINT SWELLING: 0
SINUS PAIN: 0
PARALYSIS: 0
HYPOTENSION: 1
TINGLING: 0
BACK PAIN: 0
NUMBNESS: 0
EYE IRRITATION: 0
BOWEL INCONTINENCE: 0
WHEEZING: 1
LOSS OF CONSCIOUSNESS: 0
HEADACHES: 0
NECK MASS: 0
PALPITATIONS: 0
COUGH DISTURBING SLEEP: 0
COUGH: 0
EXERCISE INTOLERANCE: 1
HEMOPTYSIS: 0
CONSTIPATION: 0
SYNCOPE: 0
DYSPNEA ON EXERTION: 0
MEMORY LOSS: 1
ORTHOPNEA: 0
LEG PAIN: 1
SNORES LOUDLY: 1
BLOATING: 1
JAUNDICE: 0
DISTURBANCES IN COORDINATION: 0
SORE THROAT: 0
TASTE DISTURBANCE: 0
EYE WATERING: 1
SINUS CONGESTION: 0
SHORTNESS OF BREATH: 1
DOUBLE VISION: 0
LIGHT-HEADEDNESS: 1
TROUBLE SWALLOWING: 0
MUSCLE CRAMPS: 0
DIZZINESS: 0
VOMITING: 0
WEAKNESS: 0
SMELL DISTURBANCE: 0
RECTAL PAIN: 0
MYALGIAS: 1
SLEEP DISTURBANCES DUE TO BREATHING: 0
BLOOD IN STOOL: 0
HOARSE VOICE: 1
MUSCLE WEAKNESS: 0
TREMORS: 1
NAUSEA: 0

## 2020-06-16 ENCOUNTER — VIRTUAL VISIT (OUTPATIENT)
Dept: NEUROLOGY | Facility: CLINIC | Age: 65
End: 2020-06-16
Payer: COMMERCIAL

## 2020-06-16 DIAGNOSIS — G23.2 MULTIPLE SYSTEM ATROPHY P (H): Primary | ICD-10-CM

## 2020-06-16 RX ORDER — RASAGILINE 1 MG/1
1 TABLET ORAL DAILY
COMMUNITY
Start: 2020-04-21 | End: 2021-04-21

## 2020-06-16 NOTE — LETTER
"6/16/2020       RE: Kobi Blanco  [address]  [place] [address]     Dear Colleague,    Thank you for referring your patient, Kobi Blanco, to the [facility] NEUROLOGY at [facility]. Please see a copy of my visit note below.    Kobi Blanco is a 65 year old male who is being evaluated via a billable video visit.      The patient has been notified of following:     \"This video visit will be conducted via a call between you and your physician/provider. We have found that certain health care needs can be provided without the need for an in-person physical exam.  This service lets us provide the care you need with a video conversation.  If a prescription is necessary we can send it directly to your pharmacy.  If lab work is needed we can place an order for that and you can then stop by our lab to have the test done at a later time.    Video visits are billed at different rates depending on your insurance coverage.  Please reach out to your insurance provider with any questions.    If during the course of the call the physician/provider feels a video visit is not appropriate, you will not be charged for this service.\"    Patient has given verbal consent for Video visit? Yes    Will anyone else be joining your video visit? No      Patient reported vitals  Weight: 205 lbs  BP: 135/86  Pulse: 67    Video-Visit Details    Type of service:  Video Visit    I changed the patient's scheduled in-person visit to a virtual video visit  because of the COVID19 crisis.  The rationale was to protect the patient from unnecessary interpersonal contact.    Chief complaint: Multiple system atrophy    History of present illness:  Mr. Kobi Blanco is an extremely pleasant 65-year-old gentleman with multiple system atrophy diagnosed at the [facility].  He continues to follow with Dr. Steph Whitlock and MSA expert at the [facility].  He saw her recently in April.  At that " time he elected not to participate in an experimental study.    Dr. Whitlock started the patient on rasagiline 1 mg daily.  The patient had tried carbidopa/levodopa without benefit and was tapered off this medication.    The patient feels that he is doing relatively well.  He feels that he has had slow decline.  It is relatively mild however.    He has no diplopia.  His vision is good.    He has no swallowing problems.    His speech becomes tired and hoarse when he speaks for long periods of time.  It also tends to be soft.    His gait is relatively normal although he has a bit of balance problems.  He is careful with this and has not fallen.  He does take stutter steps.  He is not stooped.    He continues to have restless sleep.  He takes melatonin 6 mg 3 times a day at the direction of Dr. Alonzo Melton at [place].  He still flails his arms but he does not cry out or scream at nighttime.  He was found to have mild sleep apnea on a sleep study at [facility].  He uses a mask and nasal pillows when he can.  If his RBD is not severe he sleeps better and gets more rest.    His blood pressure remains high.  Today it was 167/91.  When he stands it drops to about 115 systolic but never lower.  He is not fainting.  He is not on any pressor agents.    He continues to self cath for bladder problems.    His wife is working as an .  She asked if we can write a letter if she needs to stay home to protect him from COVID.  I said we would be more than happy to do so.    Examination:  The patient is bright and alert.  Extraocular movements are normal.  Facial expression seems normal.  His speech seems normal.    AMRs are normal in the hands and fingers.    Gait is slow and stiff with small steps and no arm swing.  No tremor or adventitial movements are seen.    Impression:  1.  Multiple system atrophy-P  2.  Mild asymptomatic orthostatic hypotension  3.  Neurogenic bladder  4.  REM behavior disorder    Recommendations:  1.   Avoid heat, stay inside in air conditioning during hot weather  2.  Tried to do mild indoor exercise daily for 5 times per week  3.  Continue rasagiline 1 mg daily.  He notes no improvement on this but knows that it is aimed at slowing decline.  4.  Zantac for acid reflux  5.  Continue melatonin 6 mg 3 tablets at bedtime per Dr. Melton  6.  Return to clinic in 6 months    Mariano Massey MD    Video Start Time: 7:55  Video End Time: 8:25    Originating Location (pt. Location): Home    Distant Location (provider location):  [facility] NEUROLOGY     Platform used for Video Visit: [facility]    Mariano Massey MD          Again, thank you for allowing me to participate in the care of your patient.      Sincerely,    Mariano Massey MD

## 2020-06-16 NOTE — PROGRESS NOTES
"Kobi Blanco is a 65 year old male who is being evaluated via a billable video visit.      The patient has been notified of following:     \"This video visit will be conducted via a call between you and your physician/provider. We have found that certain health care needs can be provided without the need for an in-person physical exam.  This service lets us provide the care you need with a video conversation.  If a prescription is necessary we can send it directly to your pharmacy.  If lab work is needed we can place an order for that and you can then stop by our lab to have the test done at a later time.    Video visits are billed at different rates depending on your insurance coverage.  Please reach out to your insurance provider with any questions.    If during the course of the call the physician/provider feels a video visit is not appropriate, you will not be charged for this service.\"    Patient has given verbal consent for Video visit? Yes    Will anyone else be joining your video visit? No      Patient reported vitals  Weight: 205 lbs  BP: 135/86  Pulse: 67    Video-Visit Details    Type of service:  Video Visit    I changed the patient's scheduled in-person visit to a virtual video visit  because of the COVID19 crisis.  The rationale was to protect the patient from unnecessary interpersonal contact.    Chief complaint: Multiple system atrophy    History of present illness:  Mr. Kobi Blanco is an extremely pleasant 65-year-old gentleman with multiple system atrophy diagnosed at the [facility].  He continues to follow with Dr. Steph Whitlock and MSA expert at the [facility].  He saw her recently in April.  At that time he elected not to participate in an experimental study.    Dr. Whitlock started the patient on rasagiline 1 mg daily.  The patient had tried carbidopa/levodopa without benefit and was tapered off this medication.    The patient feels that he is doing relatively well.  He feels that he has " had slow decline.  It is relatively mild however.    He has no diplopia.  His vision is good.    He has no swallowing problems.    His speech becomes tired and hoarse when he speaks for long periods of time.  It also tends to be soft.    His gait is relatively normal although he has a bit of balance problems.  He is careful with this and has not fallen.  He does take stutter steps.  He is not stooped.    He continues to have restless sleep.  He takes melatonin 6 mg 3 times a day at the direction of Dr. Alonzo Melotn at [place].  He still flails his arms but he does not cry out or scream at nighttime.  He was found to have mild sleep apnea on a sleep study at [place].  He uses a mask and nasal pillows when he can.  If his RBD is not severe he sleeps better and gets more rest.    His blood pressure remains high.  Today it was 167/91.  When he stands it drops to about 115 systolic but never lower.  He is not fainting.  He is not on any pressor agents.    He continues to self cath for bladder problems.    His wife is working as an .  She asked if we can write a letter if she needs to stay home to protect him from COVID.  I said we would be more than happy to do so.    Examination:  The patient is bright and alert.  Extraocular movements are normal.  Facial expression seems normal.  His speech seems normal.    AMRs are normal in the hands and fingers.    Gait is slow and stiff with small steps and no arm swing.  No tremor or adventitial movements are seen.    Impression:  1.  Multiple system atrophy-P  2.  Mild asymptomatic orthostatic hypotension  3.  Neurogenic bladder  4.  REM behavior disorder    Recommendations:  1.  Avoid heat, stay inside in air conditioning during hot weather  2.  Tried to do mild indoor exercise daily for 5 times per week  3.  Continue rasagiline 1 mg daily.  He notes no improvement on this but knows that it is aimed at slowing decline.  4.  Zantac for acid reflux  5.  Continue  melatonin 6 mg 3 tablets at bedtime per Dr. Melton  6.  Return to clinic in 6 months    Mariano Massey MD    Video Start Time: 7:55  Video End Time: 8:25    Originating Location (pt. Location): Home    Distant Location (provider location):  [facility] NEUROLOGY     Platform used for Video Visit: [facility]    Mariano Massey MD

## 2020-06-16 NOTE — PATIENT INSTRUCTIONS
Impression:  1.  Multiple system atrophy-P  2.  Mild asymptomatic orthostatic hypotension  3.  Neurogenic bladder  4.  REM behavior disorder    Recommendations:  1.  Avoid heat, stay inside in air conditioning during hot weather  2.  Tried to do mild indoor exercise daily for 5 times per week  3.  Continue rasagiline 1 mg daily.  He notes no improvement on this but knows that it is aimed at slowing decline.  4.  Zantac for acid reflux  5.  Continue melatonin 6 mg 3 tablets at bedtime per Dr. Melton  6.  Return to clinic in 6 months

## 2020-06-16 NOTE — LETTER
Date:July 8, 2020      Patient was self referred, no letter generated. Do not send.        [facility] Information

## 2020-12-14 ENCOUNTER — HEALTH MAINTENANCE LETTER (OUTPATIENT)
Age: 65
End: 2020-12-14

## 2021-01-01 ENCOUNTER — HEALTH MAINTENANCE LETTER (OUTPATIENT)
Age: 66
End: 2021-01-01

## 2021-03-11 ENCOUNTER — TELEPHONE (OUTPATIENT)
Dept: NEUROLOGY | Facility: CLINIC | Age: 66
End: 2021-03-11

## 2021-04-17 ENCOUNTER — HEALTH MAINTENANCE LETTER (OUTPATIENT)
Age: 66
End: 2021-04-17

## 2022-01-01 ENCOUNTER — HEALTH MAINTENANCE LETTER (OUTPATIENT)
Age: 67
End: 2022-01-01

## 2022-01-01 ENCOUNTER — LAB REQUISITION (OUTPATIENT)
Dept: LAB | Facility: CLINIC | Age: 67
End: 2022-01-01
Payer: COMMERCIAL

## 2022-01-01 DIAGNOSIS — G90.3 MULTI-SYSTEM DEGENERATION OF THE AUTONOMIC NERVOUS SYSTEM (H): ICD-10-CM

## 2022-01-01 LAB
ALBUMIN UR-MCNC: NEGATIVE MG/DL
AMORPH CRY #/AREA URNS HPF: ABNORMAL /HPF
APPEARANCE UR: ABNORMAL
BACTERIA #/AREA URNS HPF: ABNORMAL /HPF
BACTERIA UR CULT: ABNORMAL
BILIRUB UR QL STRIP: NEGATIVE
COLOR UR AUTO: YELLOW
GLUCOSE UR STRIP-MCNC: NEGATIVE MG/DL
HGB UR QL STRIP: ABNORMAL
KETONES UR STRIP-MCNC: NEGATIVE MG/DL
LEUKOCYTE ESTERASE UR QL STRIP: ABNORMAL
MUCOUS THREADS #/AREA URNS LPF: PRESENT /LPF
NITRATE UR QL: POSITIVE
PH UR STRIP: 7.5 [PH] (ref 5–7)
RBC URINE: 63 /HPF
RENAL TUB EPI: 5 /HPF
SP GR UR STRIP: 1.02 (ref 1–1.03)
UROBILINOGEN UR STRIP-MCNC: NORMAL MG/DL
WBC CLUMPS #/AREA URNS HPF: PRESENT /HPF
WBC URINE: >182 /HPF

## 2022-01-01 PROCEDURE — 81001 URINALYSIS AUTO W/SCOPE: CPT | Mod: ORL | Performed by: FAMILY MEDICINE

## 2022-01-01 PROCEDURE — 87086 URINE CULTURE/COLONY COUNT: CPT | Mod: ORL | Performed by: FAMILY MEDICINE
